# Patient Record
Sex: FEMALE | Race: WHITE | Employment: PART TIME | URBAN - METROPOLITAN AREA
[De-identification: names, ages, dates, MRNs, and addresses within clinical notes are randomized per-mention and may not be internally consistent; named-entity substitution may affect disease eponyms.]

---

## 2017-09-12 ENCOUNTER — ANESTHESIA EVENT (OUTPATIENT)
Dept: ENDOSCOPY | Age: 42
End: 2017-09-12
Payer: MEDICAID

## 2017-09-12 ENCOUNTER — HOSPITAL ENCOUNTER (OUTPATIENT)
Age: 42
Setting detail: OUTPATIENT SURGERY
Discharge: HOME | End: 2017-09-12
Attending: INTERNAL MEDICINE | Admitting: INTERNAL MEDICINE
Payer: MEDICAID

## 2017-09-12 ENCOUNTER — ANESTHESIA (OUTPATIENT)
Dept: ENDOSCOPY | Age: 42
End: 2017-09-12
Payer: MEDICAID

## 2017-09-12 VITALS
OXYGEN SATURATION: 96 % | HEART RATE: 86 BPM | BODY MASS INDEX: 31.1 KG/M2 | DIASTOLIC BLOOD PRESSURE: 60 MMHG | RESPIRATION RATE: 17 BRPM | TEMPERATURE: 98 F | WEIGHT: 175.5 LBS | SYSTOLIC BLOOD PRESSURE: 93 MMHG | HEIGHT: 63 IN

## 2017-09-12 LAB — HCG UR QL: NEGATIVE

## 2017-09-12 PROCEDURE — 74011250636 HC RX REV CODE- 250/636

## 2017-09-12 PROCEDURE — 81025 URINE PREGNANCY TEST: CPT

## 2017-09-12 PROCEDURE — 76040000019: Performed by: INTERNAL MEDICINE

## 2017-09-12 PROCEDURE — 74011000250 HC RX REV CODE- 250

## 2017-09-12 PROCEDURE — 74011250636 HC RX REV CODE- 250/636: Performed by: INTERNAL MEDICINE

## 2017-09-12 PROCEDURE — 76060000031 HC ANESTHESIA FIRST 0.5 HR: Performed by: INTERNAL MEDICINE

## 2017-09-12 RX ORDER — VENLAFAXINE HYDROCHLORIDE 75 MG/1
75 CAPSULE, EXTENDED RELEASE ORAL DAILY
COMMUNITY

## 2017-09-12 RX ORDER — MIDAZOLAM HYDROCHLORIDE 1 MG/ML
.25-5 INJECTION, SOLUTION INTRAMUSCULAR; INTRAVENOUS
Status: DISCONTINUED | OUTPATIENT
Start: 2017-09-12 | End: 2017-09-12 | Stop reason: HOSPADM

## 2017-09-12 RX ORDER — LIDOCAINE HYDROCHLORIDE 20 MG/ML
INJECTION, SOLUTION EPIDURAL; INFILTRATION; INTRACAUDAL; PERINEURAL AS NEEDED
Status: DISCONTINUED | OUTPATIENT
Start: 2017-09-12 | End: 2017-09-12 | Stop reason: HOSPADM

## 2017-09-12 RX ORDER — EPINEPHRINE 0.1 MG/ML
1 INJECTION INTRACARDIAC; INTRAVENOUS
Status: DISCONTINUED | OUTPATIENT
Start: 2017-09-12 | End: 2017-09-12 | Stop reason: HOSPADM

## 2017-09-12 RX ORDER — DEXTROMETHORPHAN/PSEUDOEPHED 2.5-7.5/.8
1.2 DROPS ORAL
Status: DISCONTINUED | OUTPATIENT
Start: 2017-09-12 | End: 2017-09-12 | Stop reason: HOSPADM

## 2017-09-12 RX ORDER — ATROPINE SULFATE 0.1 MG/ML
0.5 INJECTION INTRAVENOUS
Status: DISCONTINUED | OUTPATIENT
Start: 2017-09-12 | End: 2017-09-12 | Stop reason: HOSPADM

## 2017-09-12 RX ORDER — SODIUM CHLORIDE 9 MG/ML
150 INJECTION, SOLUTION INTRAVENOUS CONTINUOUS
Status: DISCONTINUED | OUTPATIENT
Start: 2017-09-12 | End: 2017-09-12 | Stop reason: HOSPADM

## 2017-09-12 RX ORDER — SUCRALFATE 1 G/10ML
1 SUSPENSION ORAL 3 TIMES DAILY
Qty: 414 ML | Refills: 0 | Status: SHIPPED | OUTPATIENT
Start: 2017-09-12 | End: 2018-08-24

## 2017-09-12 RX ORDER — SODIUM CHLORIDE 9 MG/ML
INJECTION, SOLUTION INTRAVENOUS
Status: DISCONTINUED | OUTPATIENT
Start: 2017-09-12 | End: 2017-09-12 | Stop reason: HOSPADM

## 2017-09-12 RX ORDER — SODIUM CHLORIDE 0.9 % (FLUSH) 0.9 %
5-10 SYRINGE (ML) INJECTION EVERY 8 HOURS
Status: DISCONTINUED | OUTPATIENT
Start: 2017-09-12 | End: 2017-09-12 | Stop reason: HOSPADM

## 2017-09-12 RX ORDER — SODIUM CHLORIDE 0.9 % (FLUSH) 0.9 %
5-10 SYRINGE (ML) INJECTION AS NEEDED
Status: DISCONTINUED | OUTPATIENT
Start: 2017-09-12 | End: 2017-09-12 | Stop reason: HOSPADM

## 2017-09-12 RX ORDER — PROPOFOL 10 MG/ML
INJECTION, EMULSION INTRAVENOUS AS NEEDED
Status: DISCONTINUED | OUTPATIENT
Start: 2017-09-12 | End: 2017-09-12 | Stop reason: HOSPADM

## 2017-09-12 RX ADMIN — PROPOFOL 30 MG: 10 INJECTION, EMULSION INTRAVENOUS at 08:32

## 2017-09-12 RX ADMIN — PROPOFOL 100 MG: 10 INJECTION, EMULSION INTRAVENOUS at 08:28

## 2017-09-12 RX ADMIN — SODIUM CHLORIDE: 9 INJECTION, SOLUTION INTRAVENOUS at 08:15

## 2017-09-12 RX ADMIN — PROPOFOL 30 MG: 10 INJECTION, EMULSION INTRAVENOUS at 08:30

## 2017-09-12 RX ADMIN — SODIUM CHLORIDE 150 ML/HR: 900 INJECTION, SOLUTION INTRAVENOUS at 08:14

## 2017-09-12 RX ADMIN — LIDOCAINE HYDROCHLORIDE 100 MG: 20 INJECTION, SOLUTION EPIDURAL; INFILTRATION; INTRACAUDAL; PERINEURAL at 08:28

## 2017-09-12 NOTE — H&P
[address] [facility], [address] [facility]          Pre-procedure History and Physical       NAME:  Kayley Block   :   1975   MRN:   [id number]     CHIEF COMPLAINT/HPI: See procedure note    PMH:  Past Medical History:   Diagnosis Date    Arthritis     Cholesteatoma     Depression     DM type 2 (di[facility] type 2) ([facility] 2009 after gastric bypass surgery    GERD (gastroesophageal reflux disease)     mostly before gastric bypass    H/O gastric bypass     Nausea & vomiting     with anesthesia    Panic attacks        PSH:  Past Surgical History:   Procedure Laterality Date    HX CARPAL TUNNEL RELEASE      right    HX CHOLECYSTECTOMY      HX GASTRIC BYPASS      2016    HX HEENT      3 surgeries - hole in eardrum x 2 - 2 surgeries to repair this/third surgery to removed cholesteatoma    HX ORTHOPAEDIC      plantar fascitis - right    HX OTHER SURGICAL      ulcer    HX TONSILLECTOMY      HX VASCULAR ACCESS      pt states she has not had this    MULTIPLE DELIVERY       x3       Allergies: Allergies   Allergen Reactions    Allegra [Fexofenadine] Hives       Home Medications:  Prior to Admission Medications   Prescriptions Last Dose Informant Patient Reported? Taking? INTRAUTERINE DEVICE, IUD, IU   Yes No   Sig: by IntraUTERine route. PANTOPRAZOLE SODIUM (PROTONIX PO) 2017 at Unknown time  Yes Yes   Sig: Take 40 mg by mouth daily. alprazolam (XANAX) 0.5 mg tablet 2017 at Unknown time  No Yes   Sig: Take 1 Tab by mouth two (2) times daily as needed for Sleep.   amoxicillin (AMOXIL) 500 mg capsule Not Taking at Unknown time  No No   Sig: Take 1 Cap by mouth three (3) times daily. fluticasone (FLONASE) 50 mcg/actuation nasal spray Not Taking at Unknown time  No No   Si Sprays by Both Nostrils route daily.    multivitamin (ONE A DAY) tablet 2017 at Unknown time  Yes Yes   Sig: Take 1 Tab by mouth daily. venlafaxine-SR (EFFEXOR XR) 75 mg capsule 9/11/2017 at Unknown time  Yes Yes   Sig: Take 75 mg by mouth daily. Facility-Administered Medications: None       Hospital Medications:  Current Facility-Administered Medications   Medication Dose Route Frequency    0.9% sodium chloride infusion  150 mL/hr IntraVENous CONTINUOUS    sodium chloride (NS) flush 5-10 mL  5-10 mL IntraVENous Q8H    sodium chloride (NS) flush 5-10 mL  5-10 mL IntraVENous PRN    midazolam (VERSED) injection 0.25-5 mg  0.25-5 mg IntraVENous Multiple    simethicone (MYLICON) 02PU/6.3CK oral drops 80 mg  1.2 mL Oral Multiple    atropine injection 0.5 mg  0.5 mg IntraVENous ONCE PRN    EPINEPHrine (ADRENALIN) 0.1 mg/mL syringe 1 mg  1 mg Endoscopically ONCE PRN     Facility-Administered Medications Ordered in Other Encounters   Medication Dose Route Frequency    0.9% sodium chloride infusion   IntraVENous CONTINUOUS    lidocaine (PF) (XYLOCAINE) 20 mg/mL (2 %) injection   IntraVENous PRN    propofol (DIPRIVAN) 10 mg/mL injection   IntraVENous PRN       Family History:  Family History   Problem Relation Age of Onset    Depression Mother     Heart Disease Father     Depression Sister     Cancer Other      non-lymphatic leukemia       Social History:  Social History   Substance Use Topics    Smoking status: Current Every Day Smoker     Packs/day: 1.00    Smokeless tobacco: Never Used    Alcohol use No      Comment: very rare/none per pt on 10/7/2016         PHYSICAL EXAM PRIOR TO SEDATION:  General: Alert, in no acute distress    Lungs:            CTA bilaterally  Heart:  Normal S1, S2    Abdomen: Soft, Non distended, Non tender. Normoactive bowel sounds. Assessment:   Stable for sedation administration.     Plan:   · Endoscopic procedure with sedation     Signed By: Marion Blunt MD     9/12/2017  8:28 AM

## 2017-09-12 NOTE — PROGRESS NOTES

## 2017-09-12 NOTE — ANESTHESIA PREPROCEDURE EVALUATION
Anesthetic History   No history of anesthetic complications            Review of Systems / Medical History  Patient summary reviewed, nursing notes reviewed and pertinent labs reviewed    Pulmonary  Within defined limits                 Neuro/Psych   Within defined limits           Cardiovascular  Within defined limits                     GI/Hepatic/Renal  Within defined limits   GERD           Endo/Other  Within defined limits  Diabetes    Obesity     Other Findings              Physical Exam    Airway  Mallampati: II  TM Distance: > 6 cm  Neck ROM: normal range of motion   Mouth opening: Normal     Cardiovascular  Regular rate and rhythm,  S1 and S2 normal,  no murmur, click, rub, or gallop             Dental  No notable dental hx       Pulmonary  Breath sounds clear to auscultation               Abdominal  GI exam deferred       Other Findings            Anesthetic Plan    ASA: 2  Anesthesia type: MAC          Induction: Intravenous  Anesthetic plan and risks discussed with: Patient

## 2017-09-12 NOTE — IP AVS SNAPSHOT
[address] 
[phone] Patient: Lena Ruff MRN: [id number] :1975 You are allergic to the following Allergen Reactions Allegra (Fexofenadine) Hives Recent Documentation Height Weight BMI OB Status Smoking Status 1.588 m 79.6 kg 31.59 kg/m2 IUD Current Every Day Smoker Emergency Contacts Name Discharge Info Relation Home Work Mobile Jossy Cruz [5]   [phone] About your hospitalization You were admitted on:  2017 You last received care in the:  [facility] ENDOSCOPY You were discharged on:  2017 Unit phone number:  [phone] Why you were hospitalized Your primary diagnosis was:  Not on File Providers Seen During Your Hospitalizations Provider Role Specialty Primary office phone Ravin Padron MD Attending Provider Gastroenterology [phone] Your Primary Care Physician (PCP) Primary Care Physician Office Phone Office Fax 300 [facility], [address] [place] [address] [phone] Follow-up Information None Current Discharge Medication List  
  
START taking these medications Dose & Instructions Dispensing Information Comments Morning Noon Evening Bedtime  
 sucralfate 100 mg/mL suspension Commonly known as:  Tatyana Bon Your last dose was: Your next dose is:    
   
   
 Dose:  1 g Take 10 mL by mouth three (3) times daily. Quantity:  414 mL Refills:  0 CONTINUE these medications which have NOT CHANGED Dose & Instructions Dispensing Information Comments Morning Noon Evening Bedtime ALPRAZolam 0.5 mg tablet Commonly known as:  Lulu  Your last dose was: Your next dose is:    
   
   
 Dose:  0.5 mg Take 1 Tab by mouth two (2) times daily as needed for Sleep. Quantity:  30 Tab Refills:  0  
     
   
   
   
  
 amoxicillin 500 mg capsule Commonly known as:  AMOXIL Your last dose was: Your next dose is:    
   
   
 Dose:  500 mg Take 1 Cap by mouth three (3) times daily. Quantity:  30 Cap Refills:  0  
     
   
   
   
  
 EFFEXOR XR 75 mg capsule Generic drug:  venlafaxine-SR Your last dose was: Your next dose is:    
   
   
 Dose:  75 mg Take 75 mg by mouth daily. Refills:  0  
     
   
   
   
  
 fluticasone 50 mcg/actuation nasal spray Commonly known as:  Adam Novi Your last dose was: Your next dose is:    
   
   
 Dose:  2 Spray 2 Sprays by Both Nostrils route daily. Quantity:  1 Bottle Refills:  0 INTRAUTERINE DEVICE (IUD) IU Your last dose was: Your next dose is:    
   
   
 by IntraUTERine route. Refills:  0  
     
   
   
   
  
 multivitamin tablet Commonly known as:  ONE A DAY Your last dose was: Your next dose is:    
   
   
 Dose:  1 Tab Take 1 Tab by mouth daily. Refills:  0 PROTONIX PO Your last dose was: Your next dose is:    
   
   
 Dose:  40 mg Take 40 mg by mouth daily. Refills:  0 Where to Get Your Medications Information on where to get these meds will be given to you by the nurse or doctor. ! Ask your nurse or doctor about these medications  
  sucralfate 100 mg/mL suspension Discharge Instructions [facility] [place] Kirill Alberto Viera. Lilliana Kwon M.D. 
[address] [place] [address] 
[phone] EGD DISCHARGE INSTRUCTIONS Burke Romo [id number] 
1975 DISCOMFORT: 
Sore throat- throat lozenges or warm salt water gargle Redness at IV site- apply warm compress to area; if redness or soreness persist- contact your physician Gaseous discomfort- walking, belching will help relieve any discomfort You may not operate a vehicle for 12 hours You may not engage in an occupation involving machinery or appliances for the  rest of today You may not drink alcoholic beverages for at least 12 hours Avoid making any critical decisions for at least 24 hours DIET: 
 You may resume your normal diet, but some patients find that heavy or large  meals may lead to indigestion or vomiting. I suggest a light meal as first food  intake. ACTIVITY: 
You may resume your normal daily activities. It is recommended that you spend the remainder of the day resting - avoid any strenuous activity. CALL CURTIS Sotelo ANY SIGN OF: Increasing pain, nausea, vomiting Abdominal distension (swelling) Significant bleeding (oral or rectal) Fever Pain in chest area Shortness of breath Additional Instructions: 
 Call Dr. Cher Ruvalcaba if any questions or problems at [phone] Anastomotic stricture due to ulcer. Smoking cessation. Continue PPI. Avoid NSIDs. Carafate trial if no history of kidney dysfunction. Discharge Orders None Introducing [facility]! Josh Sanchez introduces BlueCava patient portal. Now you can access parts of your medical record, email your doctor's office, and request medication refills online. 1. In your internet browser, go to [email or web address] 2. Click on the First Time User? Click Here link in the Sign In box. You will see the New Member Sign Up page. 3. Enter your BlueCava Access Code exactly as it appears below. You will not need to use this code after youve completed the sign-up process. If you do not sign up before the expiration date, you must request a new code. · BlueC[employer] Access Code: [email or web address] Expires: 12/11/2017  8:52 AM 
 
4. Enter the last four digits of your Social Security Number (xxxx) and Date of Birth (mm/dd/yyyy) as indicated and click Submit.  You will be taken to the next sign-up page. 5. Create a Zuki ID. This will be your Zuki login ID and cannot be changed, so think of one that is secure and easy to remember. 6. Create a Zuki password. You can change your password at any time. 7. Enter your Password Reset Question and Answer. This can be used at a later time if you forget your password. 8. Enter your e-mail address. You will receive e-mail notification when new information is available in [address] 9. Click Sign Up. You can now view and download portions of your medical record. 10. Click the Download Summary menu link to download a portable copy of your medical information. If you have questions, please visit the Frequently Asked Questions section of the Zuki website. Remember, Zuki is NOT to be used for urgent needs. For medical emergencies, dial 911. Now available from your iPhone and Android! General Information Please provide this summary of care documentation to your next provider. Patient Signature:  ____________________________________________________________ Date:  ____________________________________________________________  
  
[facility] Signature:  ____________________________________________________________ Date:  ____________________________________________________________

## 2017-09-12 NOTE — ROUTINE PROCESS
Lena adonisisabella  1975  [id number]    Situation:  Verbal report received from: [facility]  Procedure: Procedure(s):  ESOPHAGOGASTRODUODENOSCOPY (EGD)    Background:    Preoperative diagnosis: ANASTOMOSIS STRICTURE  Postoperative diagnosis: 1. Anastomotic Ulcer   2. Anastomotic Stricture    :  Dr. Chhaya Hoyos  Assistant(s): Endoscopy Technician-1: Marcella Genao  Endoscopy RN-1: Hanna Reyez RN    Specimens: * No specimens in log *  H. Pylori  no    Assessment:  Intra-procedure medications     Anesthesia gave intra-procedure sedation and medications, see anesthesia flow sheet yes    Intravenous fluids: NS@ KVO     Vital signs stable     Abdominal assessment: round and soft     Recommendation:  Discharge patient per MD order.     Family or Friend   Permission to share finding with family or friend yes

## 2017-09-12 NOTE — DISCHARGE INSTRUCTIONS
Elliot Castillo 912 Jace Che M.D.  174 [facility], [address] [place] [address]  [phone]         [address]  [id number]  1975    DISCOMFORT:  Sore throat- throat lozenges or warm salt water gargle  Redness at IV site- apply warm compress to area; if redness or soreness persist- contact your physician  Gaseous discomfort- walking, belching will help relieve any discomfort  You may not operate a vehicle for 12 hours  You may not engage in an occupation involving machinery or appliances for the  rest of today  You may not drink alcoholic beverages for at least 12 hours  Avoid making any critical decisions for at least 24 hours    DIET:   You may resume your normal diet, but some patients find that heavy or large  meals may lead to indigestion or vomiting. I suggest a light meal as first food  intake. ACTIVITY:  You may resume your normal daily activities. It is recommended that you spend the remainder of the day resting - avoid any strenuous activity. CALL CURTIS Palomares ANY SIGN OF:   Increasing pain, nausea, vomiting  Abdominal distension (swelling)  Significant bleeding (oral or rectal)  Fever   Pain in chest area  Shortness of breath    Additional Instructions:   Call Dr. Jace Che if any questions or problems at [phone]   Anastomotic stricture due to ulcer. Smoking cessation. Continue PPI. Avoid NSIDs. Carafate trial if no history of kidney dysfunction.

## 2017-09-12 NOTE — ANESTHESIA POSTPROCEDURE EVALUATION
Post-Anesthesia Evaluation and Assessment    Patient: Emily Pressley MRN: [id number]  SSN: [id number]    YOB: 1975  Age: 42 y.o. Sex: female       Cardiovascular Function/Vital Signs  Visit Vitals    BP 93/60    Pulse 86    Temp 36.7 °C (98 °F)    Resp 17    Ht 5' 2.5\" (1.588 m)    Wt 79.6 kg (175 lb 8 oz)    SpO2 96%    BMI 31.59 kg/m2       Patient is status post MAC anesthesia for Procedure(s):  ESOPHAGOGASTRODUODENOSCOPY (EGD). Nausea/Vomiting: None    Postoperative hydration reviewed and adequate. Pain:  Pain Scale 1: Numeric (0 - 10) (09/12/17 0801)  Pain Intensity 1: 0 (09/12/17 0744)   Managed    Neurological Status: At baseline    Mental Status and Level of Consciousness: Arousable    Pulmonary Status:   O2 Device: Nasal cannula (09/12/17 0834)   Adequate oxygenation and airway patent    Complications related to anesthesia: None    Post-anesthesia assessment completed.  No concerns    Signed By: Raffaele Lamar MD     September 12, 2017

## 2017-09-12 NOTE — PROCEDURES
Elliot Castillo 912 CURTIS Sigala [facility], [address] [facility]  [phone]               Esophagogastroduodenoscopy (EGD) Procedure Note    NAME: Burke Romo  :  1975  MRN:  [id number]    Indications:  Abdominal pain, epigastric; gastrojejunal anastomotic stricture/ulcer, gastric bypass     : Lorraine Jacob MD    Referring Provider:  Betzaida Pace MD; Dudley Garcia MD    Medicine:  [facility] anesthesia      Procedure Details:  After informed consent was obtained with all risks and benefits of the procedure explained and preprocedure exam completed, the patient was placed in the left lateral decubitus position. Universal protocol for patient identification was performed and documented in the nursing notes. Throughout the procedure, the patient's blood pressure was monitored at least every five minutes; pulse, and oxygen saturations were monitored continuously. All vital signs were documented in the nursing notes. The endoscope was inserted into the mouth and advanced under direct vision to proximal jejunum. A careful inspection was made as the gastroscope was withdrawn, including a retroflexed view of the proximal stomach; findings and interventions are described below. Findings:   Esophagus: normal  Stomach: no hiatal hernia, pouch length 5 cm; moderate anastomotic stricture secondary to circumferential ulcer at the anastomosis (dilation not performed). Able to transverse with the endoscope. Jejunum: normal    Interventions:    none    Specimens:   * No specimens in log *     EBL: None          Complications:     No immediate complications        Impression:  -As above. Recommendations:  -Acid suppression with a proton pump inhibitor.  -Smoking cessation.  -Carafate trial.    Signed by:  Lorraine Jacob MD         2017 8:37 AM

## 2018-08-24 RX ORDER — ALPRAZOLAM 1 MG/1
1 TABLET ORAL
COMMUNITY

## 2018-08-24 RX ORDER — PEDIATRIC MULTIVITAMIN NO.17
2 TABLET,CHEWABLE ORAL DAILY
COMMUNITY
End: 2019-04-24

## 2018-08-24 RX ORDER — LANOLIN ALCOHOL/MO/W.PET/CERES
1000 CREAM (GRAM) TOPICAL DAILY
COMMUNITY
End: 2019-04-24

## 2018-08-24 RX ORDER — CHOLECALCIFEROL (VITAMIN D3) 125 MCG
5000 CAPSULE ORAL DAILY
COMMUNITY
End: 2020-07-10

## 2018-08-24 RX ORDER — DIPHENHYDRAMINE HCL 25 MG
25 CAPSULE ORAL
COMMUNITY

## 2018-08-27 ENCOUNTER — ANESTHESIA EVENT (OUTPATIENT)
Dept: ENDOSCOPY | Age: 43
End: 2018-08-27
Payer: MEDICAID

## 2018-08-27 NOTE — ANESTHESIA PREPROCEDURE EVALUATION
Anesthetic History     PONV          Review of Systems / Medical History  Patient summary reviewed, nursing notes reviewed and pertinent labs reviewed    Pulmonary  Within defined limits                 Neuro/Psych         Psychiatric history     Cardiovascular  Within defined limits                     GI/Hepatic/Renal     GERD: well controlled      PUD     Endo/Other    Diabetes: well controlled, type 2    Obesity and arthritis     Other Findings            Physical Exam    Airway  Mallampati: II  TM Distance: > 6 cm  Neck ROM: normal range of motion   Mouth opening: Normal     Cardiovascular  Regular rate and rhythm,  S1 and S2 normal,  no murmur, click, rub, or gallop             Dental    Dentition: Edentulous     Pulmonary  Breath sounds clear to auscultation               Abdominal  GI exam deferred       Other Findings            Anesthetic Plan    ASA: 2  Anesthesia type: MAC          Induction: Intravenous  Anesthetic plan and risks discussed with: Patient

## 2018-08-28 ENCOUNTER — ANESTHESIA (OUTPATIENT)
Dept: ENDOSCOPY | Age: 43
End: 2018-08-28
Payer: MEDICAID

## 2018-08-28 ENCOUNTER — HOSPITAL ENCOUNTER (OUTPATIENT)
Age: 43
Setting detail: OUTPATIENT SURGERY
Discharge: HOME | End: 2018-08-28
Attending: INTERNAL MEDICINE | Admitting: INTERNAL MEDICINE
Payer: MEDICAID

## 2018-08-28 VITALS
HEIGHT: 63 IN | SYSTOLIC BLOOD PRESSURE: 95 MMHG | RESPIRATION RATE: 23 BRPM | OXYGEN SATURATION: 95 % | WEIGHT: 203 LBS | HEART RATE: 94 BPM | DIASTOLIC BLOOD PRESSURE: 64 MMHG | BODY MASS INDEX: 35.97 KG/M2 | TEMPERATURE: 97.5 F

## 2018-08-28 LAB — HCG UR QL: NEGATIVE

## 2018-08-28 PROCEDURE — 76060000031 HC ANESTHESIA FIRST 0.5 HR: Performed by: INTERNAL MEDICINE

## 2018-08-28 PROCEDURE — C1726 CATH, BAL DIL, NON-VASCULAR: HCPCS | Performed by: INTERNAL MEDICINE

## 2018-08-28 PROCEDURE — 74011250636 HC RX REV CODE- 250/636

## 2018-08-28 PROCEDURE — 81025 URINE PREGNANCY TEST: CPT

## 2018-08-28 PROCEDURE — 76040000019: Performed by: INTERNAL MEDICINE

## 2018-08-28 PROCEDURE — 77030018712 HC DEV BLLN INFL BSC -B: Performed by: INTERNAL MEDICINE

## 2018-08-28 RX ORDER — SODIUM CHLORIDE 0.9 % (FLUSH) 0.9 %
5-10 SYRINGE (ML) INJECTION EVERY 8 HOURS
Status: DISCONTINUED | OUTPATIENT
Start: 2018-08-28 | End: 2018-08-28 | Stop reason: HOSPADM

## 2018-08-28 RX ORDER — PROPOFOL 10 MG/ML
INJECTION, EMULSION INTRAVENOUS AS NEEDED
Status: DISCONTINUED | OUTPATIENT
Start: 2018-08-28 | End: 2018-08-28 | Stop reason: HOSPADM

## 2018-08-28 RX ORDER — EPINEPHRINE 0.1 MG/ML
1 INJECTION INTRACARDIAC; INTRAVENOUS
Status: DISCONTINUED | OUTPATIENT
Start: 2018-08-28 | End: 2018-08-28 | Stop reason: HOSPADM

## 2018-08-28 RX ORDER — SODIUM CHLORIDE 9 MG/ML
150 INJECTION, SOLUTION INTRAVENOUS CONTINUOUS
Status: DISCONTINUED | OUTPATIENT
Start: 2018-08-28 | End: 2018-08-28 | Stop reason: HOSPADM

## 2018-08-28 RX ORDER — DEXTROMETHORPHAN/PSEUDOEPHED 2.5-7.5/.8
1.2 DROPS ORAL
Status: DISCONTINUED | OUTPATIENT
Start: 2018-08-28 | End: 2018-08-28 | Stop reason: HOSPADM

## 2018-08-28 RX ORDER — SUCRALFATE 1 G/10ML
1 SUSPENSION ORAL 3 TIMES DAILY
Qty: 414 ML | Refills: 0 | Status: SHIPPED | OUTPATIENT
Start: 2018-08-28 | End: 2018-11-13

## 2018-08-28 RX ORDER — LIDOCAINE HYDROCHLORIDE 20 MG/ML
INJECTION, SOLUTION EPIDURAL; INFILTRATION; INTRACAUDAL; PERINEURAL AS NEEDED
Status: DISCONTINUED | OUTPATIENT
Start: 2018-08-28 | End: 2018-08-28 | Stop reason: HOSPADM

## 2018-08-28 RX ORDER — ATROPINE SULFATE 0.1 MG/ML
0.5 INJECTION INTRAVENOUS
Status: DISCONTINUED | OUTPATIENT
Start: 2018-08-28 | End: 2018-08-28 | Stop reason: HOSPADM

## 2018-08-28 RX ORDER — SODIUM CHLORIDE 0.9 % (FLUSH) 0.9 %
5-10 SYRINGE (ML) INJECTION AS NEEDED
Status: DISCONTINUED | OUTPATIENT
Start: 2018-08-28 | End: 2018-08-28 | Stop reason: HOSPADM

## 2018-08-28 RX ORDER — SODIUM CHLORIDE 9 MG/ML
INJECTION, SOLUTION INTRAVENOUS
Status: DISCONTINUED | OUTPATIENT
Start: 2018-08-28 | End: 2018-08-28 | Stop reason: HOSPADM

## 2018-08-28 RX ORDER — MIDAZOLAM HYDROCHLORIDE 1 MG/ML
.25-5 INJECTION, SOLUTION INTRAMUSCULAR; INTRAVENOUS
Status: DISCONTINUED | OUTPATIENT
Start: 2018-08-28 | End: 2018-08-28 | Stop reason: HOSPADM

## 2018-08-28 RX ADMIN — PROPOFOL 25 MG: 10 INJECTION, EMULSION INTRAVENOUS at 08:10

## 2018-08-28 RX ADMIN — PROPOFOL 25 MG: 10 INJECTION, EMULSION INTRAVENOUS at 08:07

## 2018-08-28 RX ADMIN — PROPOFOL 25 MG: 10 INJECTION, EMULSION INTRAVENOUS at 08:09

## 2018-08-28 RX ADMIN — PROPOFOL 25 MG: 10 INJECTION, EMULSION INTRAVENOUS at 08:13

## 2018-08-28 RX ADMIN — PROPOFOL 75 MG: 10 INJECTION, EMULSION INTRAVENOUS at 08:06

## 2018-08-28 RX ADMIN — PROPOFOL 25 MG: 10 INJECTION, EMULSION INTRAVENOUS at 08:08

## 2018-08-28 RX ADMIN — LIDOCAINE HYDROCHLORIDE 40 MG: 20 INJECTION, SOLUTION EPIDURAL; INFILTRATION; INTRACAUDAL; PERINEURAL at 08:06

## 2018-08-28 RX ADMIN — PROPOFOL 25 MG: 10 INJECTION, EMULSION INTRAVENOUS at 08:12

## 2018-08-28 RX ADMIN — SODIUM CHLORIDE: 9 INJECTION, SOLUTION INTRAVENOUS at 08:11

## 2018-08-28 RX ADMIN — PROPOFOL 25 MG: 10 INJECTION, EMULSION INTRAVENOUS at 08:14

## 2018-08-28 RX ADMIN — SODIUM CHLORIDE: 9 INJECTION, SOLUTION INTRAVENOUS at 07:45

## 2018-08-28 RX ADMIN — PROPOFOL 25 MG: 10 INJECTION, EMULSION INTRAVENOUS at 08:16

## 2018-08-28 RX ADMIN — PROPOFOL 25 MG: 10 INJECTION, EMULSION INTRAVENOUS at 08:11

## 2018-08-28 NOTE — PROGRESS NOTES
CRE balloon dilatation of the esophagus   10 mm Balloon inflated to 3 ATMs and held for 60 seconds. 11 mm Balloon inflated to 5 ATMs and held for 60 seconds. 12 mm Balloon inflated to 8 ATMs and held for 60 seconds. No subcutaneous crepitus of the chest or cervical region was noted post dilatation.

## 2018-08-28 NOTE — PROCEDURES
[address] [place] [address] Marielle Roberts M.D.  Gely Higuera, [address] [place] [address]  [phone]               Esophagogastroduodenoscopy (EGD) Procedure Note    NAME: Diana De La Cruz  :  1975  MRN:  [id number]    Indications:  Vomiting, gastric bypass     : Makenzie Roman MD    Referring Provider:  Marielle Rivers MD; Nessa Holly MD    Medicine:  [facility] anesthesia      Procedure Details:  After informed consent was obtained with all risks and benefits of the procedure explained and preprocedure exam completed, the patient was placed in the left lateral decubitus position. Universal protocol for patient identification was performed and documented in the nursing notes. Throughout the procedure, the patient's blood pressure was monitored at least every five minutes; pulse, and oxygen saturations were monitored continuously. All vital signs were documented in the nursing notes. The endoscope was inserted into the mouth and advanced under direct vision to proximal jejunum. A careful inspection was made as the gastroscope was withdrawn, including a retroflexed view of the proximal stomach; findings and interventions are described below. Findings:   Esophagus:normal  Stomach: anastomotic stricture-able to pass with endoscope; s/p TTS balloon dilation from 10 mm up to 12 mm. Jejunum: normal    Interventions:    anastomosis dilation    Specimens:   * No specimens in log *     EBL: None          Complications:     No immediate complications        Impression:  -As above. Recommendations:  -Acid suppression with a proton pump inhibitor.  -Start Carafate  -SMOKING CESSATION    Signed by:  Makenzie Roman MD         2018 8:26 AM

## 2018-08-28 NOTE — PROGRESS NOTES

## 2018-08-28 NOTE — H&P
[address]  174 [facility], [address] [place] [address]          Pre-procedure History and Physical       NAME:  Allen Jefferson   :   1975   MRN:   [id number]     CHIEF COMPLAINT/HPI: See procedure note    PMH:  Past Medical History:   Diagnosis Date    Arthritis     Cholesteatoma     Depression     DM type 2 (diabetes mellitus, type 2) ([address] 2009    resolved 2016 after gastric bypass surgery    GERD (gastroesophageal reflux disease)     mostly before gastric bypass    H/O gastric bypass     Ill-defined condition     anastomatic stricture    Morbid obesity (HCC)     Nausea & vomiting     with anesthesia    Panic attacks     PUD (peptic ulcer disease)        PSH:  Past Surgical History:   Procedure Laterality Date    HX CARPAL TUNNEL RELEASE      right    HX CHOLECYSTECTOMY      HX GASTRIC BYPASS      2016    HX HEENT      3 surgeries - hole in eardrum x 2 - 2 surgeries to repair this/third surgery to removed cholesteatoma    HX ORTHOPAEDIC      plantar fascitis - right    HX OTHER SURGICAL      ulcer    HX TONSILLECTOMY      HX VASCULAR ACCESS      pt states she has not had this    MULTIPLE DELIVERY       x3       Allergies: Allergies   Allergen Reactions    Allegra [Fexofenadine] Hives       Home Medications:  Prior to Admission Medications   Prescriptions Last Dose Informant Patient Reported? Taking? ALPRAZolam (XANAX) 1 mg tablet 2018 at Unknown time  Yes Yes   Sig: Take 1 mg by mouth two (2) times a day. INTRAUTERINE DEVICE, IUD, IU Unknown at Unknown time  Yes No   Sig: by IntraUTERine route. OTHER 2018 at Unknown time  Yes Yes   Sig: Viactiv calcium. Chews 2 po once daily. PANTOPRAZOLE SODIUM (PROTONIX PO) 2018 at Unknown time  Yes Yes   Sig: Take 40 mg by mouth daily. cholecalciferol (VITAMIN D3) 5,000 unit capsule 2018 at Unknown time  Yes Yes   Sig: Take 5,000 Units by mouth daily.    cyanocobalamin (VITAMIN B-12) 500 mcg tablet 8/27/2018 at Unknown time  Yes Yes   Sig: Take 1,000 mcg by mouth daily. diphenhydrAMINE (ALLERGY RELIEF,DIPHENHYDRAMIN,) 25 mg capsule 8/27/2018 at Unknown time  Yes Yes   Sig: Take 25 mg by mouth daily. pediatric multivitamins (FLINTSTONES MULTIVITAMIN) chewable tablet 8/26/2018 at Unknown time  Yes Yes   Sig: Take 2 Tabs by mouth daily. venlafaxine-SR (EFFEXOR XR) 75 mg capsule 8/27/2018 at Unknown time  Yes Yes   Sig: Take 75 mg by mouth daily.       Facility-Administered Medications: None       Hospital Medications:  Current Facility-Administered Medications   Medication Dose Route Frequency    0.9% sodium chloride infusion  150 mL/hr IntraVENous CONTINUOUS    sodium chloride (NS) flush 5-10 mL  5-10 mL IntraVENous Q8H    sodium chloride (NS) flush 5-10 mL  5-10 mL IntraVENous PRN    midazolam (VERSED) injection 0.25-5 mg  0.25-5 mg IntraVENous Multiple    simethicone (MYLICON) 37MC/2.0SO oral drops 80 mg  1.2 mL Oral Multiple    atropine injection 0.5 mg  0.5 mg IntraVENous ONCE PRN    EPINEPHrine (ADRENALIN) 0.1 mg/mL syringe 1 mg  1 mg Endoscopically ONCE PRN     Facility-Administered Medications Ordered in Other Encounters   Medication Dose Route Frequency    0.9% sodium chloride infusion   IntraVENous CONTINUOUS       Family History:  Family History   Problem Relation Age of Onset    Depression Mother     Other Mother      osteopenia    Depression Sister     Cancer Other      non-lymphatic leukemia    Heart Disease Paternal Uncle     Alzheimer Maternal Grandmother     Alzheimer Paternal Grandmother        Social History:  Social History   Substance Use Topics    Smoking status: Current Every Day Smoker     Packs/day: 1.00    Smokeless tobacco: Never Used    Alcohol use No      Comment: very rare/none per pt on 10/7/2016         PHYSICAL EXAM PRIOR TO SEDATION:  General: Alert, in no acute distress    Lungs:            CTA bilaterally  Heart:  Normal S1, S2    Abdomen: Soft, Non distended, Non tender. Normoactive bowel sounds. Assessment:   Stable for sedation administration.     Plan:   · Endoscopic procedure with sedation     Signed By: Alicia Monahan MD     8/28/2018  8:05 AM

## 2018-08-28 NOTE — ANESTHESIA POSTPROCEDURE EVALUATION
Post-Anesthesia Evaluation and Assessment Patient: Rylie Dotson MRN: [id number]  SSN: [id number] YOB: 1975  Age: 36 y.o. Sex: female Cardiovascular Function/Vital Signs Visit Vitals  BP 95/64  Pulse 94  Temp 36.4 °C (97.5 °F)  Resp 23  
 Ht 5' 2.5\" (1.588 m)  Wt 92.1 kg (203 lb)  SpO2 95%  BMI 36.54 kg/m2 Patient is status post MAC anesthesia for Procedure(s): ESOPHAGOGASTRODUODENOSCOPY (EGD) Anastomoic  DILATION. Nausea/Vomiting: None Postoperative hydration reviewed and adequate. Pain: 
Pain Scale 1: Numeric (0 - 10) (08/28/18 0904) Pain Intensity 1: 0 (08/28/18 0904) Managed Neurological Status: At baseline Mental Status and Level of Consciousness: Arousable Pulmonary Status:  
O2 Device: Room air (08/28/18 0904) Adequate oxygenation and airway patent Complications related to anesthesia: None Post-anesthesia assessment completed. No concerns Signed By: Leonie Yuan MD   
 August 28, 2018

## 2018-08-28 NOTE — IP AVS SNAPSHOT
[address] 
[phone] Patient: Kassy Elizondo MRN: [id number] :1975 About your hospitalization You were admitted on:  2018 You last received care in the:  [facility] ENDOSCOPY You were discharged on:  2018 Why you were hospitalized Your primary diagnosis was:  Not on File Follow-up Information None Discharge Orders None A check timothy indicates which time of day the medication should be taken. My Medications START taking these medications Instructions Each Dose to Equal  
 Morning Noon Evening Bedtime  
 sucralfate 100 mg/mL suspension Commonly known as:  Georgeana Mallet Your last dose was: Your next dose is: Take 10 mL by mouth three (3) times daily. 1 g CONTINUE taking these medications Instructions Each Dose to Equal  
 Morning Noon Evening Bedtime ALLERGY RELIEF(DIPHENHYDRAMIN) 25 mg capsule Generic drug:  diphenhydrAMINE Your last dose was: Your next dose is: Take 25 mg by mouth daily. 25 mg  
    
   
   
   
  
 cholecalciferol 5,000 unit capsule Commonly known as:  VITAMIN D3 Your last dose was: Your next dose is: Take 5,000 Units by mouth daily. 5000 Units EFFEXOR XR 75 mg capsule Generic drug:  venlafaxine-SR Your last dose was: Your next dose is: Take 75 mg by mouth daily. 75 mg FLINTSTONES MULTIVITAMIN chewable tablet Generic drug:  pediatric multivitamins Your last dose was: Your next dose is: Take 2 Tabs by mouth daily. 2 Tab INTRAUTERINE DEVICE (IUD) IU Your last dose was: Your next dose is:    
   
   
 by IntraUTERine route.   
     
   
   
   
  
 OTHER  
   
 Your last dose was: Your next dose is:    
   
   
 Viactiv calcium. Chews 2 po once daily. PROTONIX PO Your last dose was: Your next dose is: Take 40 mg by mouth daily. 40 mg  
    
   
   
   
  
 VITAMIN B-12 500 mcg tablet Generic drug:  cyanocobalamin Your last dose was: Your next dose is: Take 1,000 mcg by mouth daily. 1000 mcg XANAX 1 mg tablet Generic drug:  ALPRAZolam  
   
Your last dose was: Your next dose is: Take 1 mg by mouth two (2) times a day. 1 mg Where to Get Your Medications Information on where to get these meds will be given to you by the nurse or doctor. ! Ask your nurse or doctor about these medications  
  sucralfate 100 mg/mL suspension Discharge Instructions [address] [facility] Nish Estimable. Lew Coombs M.D. 
[address] [place] [address] 
[phone] EGD DISCHARGE INSTRUCTIONS Lenora Malloy [id number] 
1975 DISCOMFORT: 
Sore throat- throat lozenges or warm salt water gargle Redness at IV site- apply warm compress to area; if redness or soreness persist- contact your physician Gaseous discomfort- walking, belching will help relieve any discomfort You may not operate a vehicle for 12 hours You may not engage in an occupation involving machinery or appliances for the  rest of today You may not drink alcoholic beverages for at least 12 hours Avoid making any critical decisions for at least 24 hours DIET: 
 You may resume your normal diet, but some patients find that heavy or large  meals may lead to indigestion or vomiting. I suggest a light meal as first food  Intake. I recommend a whole food, plant-based diet for your overall health. ACTIVITY: 
You may resume your normal daily activities.   It is recommended that you spend the remainder of the day resting - avoid any strenuous activity. CALL CURTIS Kamara ANY SIGN OF: Increasing pain, nausea, vomiting Abdominal distension (swelling) Significant bleeding (oral or rectal) Fever Pain in chest area Shortness of breath Additional Instructions: 
 Call Dr. Rubi Taveras if any questions or problems at [phone] EGD showed anastomotic ulcer s/p dilation. SMOKING CESSATION. PPI. Trial of Carafate. Follow-up with Dr. Kaleb Fall. D[employer] Activation Thank you for requesting access to [employer]. Please follow the instructions below to securely access and download your online medical record. DaggerFoil Group allows you to send messages to your doctor, view your test results, renew your prescriptions, schedule appointments, and more. How Do I Sign Up? 1. In your internet browser, go to [email or web address] 
2. Click on the First Time User? Click Here link in the Sign In box. You will be redirect to the New Member Sign Up page. 3. Enter your DaggerFoil Group Access Code exactly as it appears below. You will not need to use this code after youve completed the sign-up process. If you do not sign up before the expiration date, you must request a new code. Dagger[employer]oil [employer] Access Code: [email or web address] Expires: 2018  6:39 AM (This is the date your [employer] access code will ) 4. Enter the last four digits of your Social Security Number (xxxx) and Date of Birth (mm/dd/yyyy) as indicated and click Submit. You will be taken to the next sign-up page. 5. Create a Dagger[employer] ID. This will be your [employer] login ID and cannot be changed, so think of one that is secure and easy to remember. 6. Create a D[employer] password. You can change your password at any time. 7. Enter your Password Reset Question and Answer. This can be used at a later time if you forget your password. 8. Enter your e-mail address. You will receive e-mail notification when new information is available in [address] 9. Click Sign Up. You can now view and download portions of your medical record. 10. Click the Download Summary menu link to download a portable copy of your medical information. Additional Information If you have questions, please visit the Frequently Asked Questions section of the [employer] website at [email or web address] Remember, MyChart is NOT to be used for urgent needs. For medical emergencies, dial 911. Esophageal Dilation: What to Expect at [facility] Your Recovery After you have esophageal dilation, you will stay at the hospital or surgery center for 1 to 2 hours. This will allow the medicine to wear off. You will be able to go home after your doctor or nurse checks to make sure you are not having any problems. This care sheet gives you a general idea about how long it will take for you to recover. But each person recovers at a different pace. Follow the steps below to get better as quickly as possible. How can you care for yourself at home? Activity 
  · Rest as much as you need to after you go home.  
  · You should be able to go back to your usual activities the day after the procedure. Diet 
  · Follow your doctor's directions for eating after the procedure.  
  · Drink plenty of fluids (unless your doctor has told you not to). Medicines 
  · Your doctor will tell you if and when you can restart your medicines. He or she will also give you instructions about taking any new medicines.  
  · If you take blood thinners, such as warfarin (Coumadin), clopidogrel (Plavix), or aspirin, be sure to talk to your doctor. He or she will tell you if and when to start taking those medicines again. Make sure that you understand exactly what your doctor wants you to do.  
  · If you have a sore throat the day after the procedure, use an over-the-counter spray to numb your throat. Sucking on throat lozenges and gargling with warm salt water may also help relieve your symptoms. Follow-up care is a key part of your treatment and safety. Be sure to make and go to all appointments, and call your doctor if you are having problems. It's also a good idea to know your test results and keep a list of the medicines you take. When should you call for help? Call 911 anytime you think you may need emergency care. For example, call if: 
  · You passed out (lost consciousness).  
  · You have trouble breathing.  
  · Your stools are maroon or very bloody  
 Call your doctor now or seek immediate medical care if: 
  · You have new or worse belly pain.  
  · You have a fever.  
  · You have new or more blood in your stools.  
  · You are sick to your stomach and cannot drink fluids.  
  · You cannot pass stools or gas.  
  · You have pain that does not get better after you take pain medicine.  
 Watch closely for changes in your health, and be sure to contact your doctor if: 
  · Your throat still hurts after a day or two.  
  · You do not get better as expected. Where can you learn more? Go to http://queta-boby.info/. Enter D823 in the search box to learn more about \"Esophageal Dilation: What to Expect at Home. \" Current as of: May 12, 2017 Content Version: 11.7 © 7087-0034 AdScoot, Incorporated. Care instructions adapted under license by TheWrap (which disclaims liability or warranty for this information). If you have questions about a medical condition or this instruction, always ask your healthcare professional. [place] [address] any warranty or liability for your use of this information. Introducing hospitals & HEALTH SERVICES! [facility] introduces CrowdPC patient portal. Now you can access parts of your medical record, email your doctor's office, and request medication refills online. 1. In your internet browser, go to [email or web address] 2. Click on the First Time User? Click Here link in the Sign In box. You will see the New Member Sign Up page. 3. Enter your KelBillet Access Code exactly as it appears below. You will not need to use this code after youve completed the sign-up process. If you do not sign up before the expiration date, you must request a new code. · KelBillet Access Code: [email or web address] Expires: 11/26/2018  6:39 AM 
 
4. Enter the last four digits of your Social Security Number (xxxx) and Date of Birth (mm/dd/yyyy) as indicated and click Submit. You will be taken to the next sign-up page. 5. Create a KelBillet ID. This will be your KelBillet login ID and cannot be changed, so think of one that is secure and easy to remember. 6. Create a KelBillet password. You can change your password at any time. 7. Enter your Password Reset Question and Answer. This can be used at a later time if you forget your password. 8. Enter your e-mail address. You will receive e-mail notification when new information is available in [address] 9. Click Sign Up. You can now view and download portions of your medical record. 10. Click the Download Summary menu link to download a portable copy of your medical information. If you have questions, please visit the Frequently Asked Questions section of the KelBillet website. Remember, KelBillet is NOT to be used for urgent needs. For medical emergencies, dial 911. Now available from your iPhone and Android! Introducing Bony Wilson As a [facility] patient, I wanted to make you aware of our electronic visit tool called Bony Wilson. Jensendieteritalo Cortezer 24/7 allows you to connect within minutes with a medical provider 24 hours a day, seven days a week via a mobile device or tablet or logging into a secure website from your computer. You can access Bony Wilson from anywhere in the [place].  
 
A virtual visit might be right for you when you have a simple condition and feel like you just dont want to get out of bed, or cant get away from work for an appointment, when your regular [employer] provider is not available (evenings, weekends or holidays), or when youre out of town and need minor care. Electronic visits cost only $49 and if the Therma-Waveer 24/7 provider determines a prescription is needed to treat your condition, one can be electronically transmitted to a nearby pharmacy*. Please take a moment to enroll today if you have not already done so. The enrollment process is free and takes just a few minutes. To enroll, please download the CRAVE 24/7 sharee to your tablet or phone, or visit [email or web address] to enroll on your computer. And, as an [address] [place] [address] patient with a Re.nooble account, the results of your visits will be scanned into your electronic medical record and your primary care provider will be able to view the scanned results. We urge you to continue to see your [facility] provider for your ongoing medical care. And while your primary care provider may not be the one available when you seek a One Parts Bill virtual visit, the peace of mind you get from getting a real diagnosis real time can be priceless. For more information on One Parts Bill, view our Frequently Asked Questions (FAQs) at [email or web address]. Sincerely, 
 
Nicol De Souza MD 
Chief Medical Officer [facility] Sarah Rell *:  certain medications cannot be prescribed via One Parts Bill Providers Seen During Your Hospitalization Provider Specialty Primary office phone Rere Tabares MD Gastroenterology [phone] Your Primary Care Physician (PCP) Primary Care Physician Office Phone Office Fax [address] [facility], [address] [phone] You are allergic to the following Allergen Reactions Allegra (Fexofenadine) Hives Recent Documentation Height Weight BMI OB Status Smoking Status 1.588 m 92.1 kg 36.54 kg/m2 IUD Current Every Day Smoker Emergency Contacts Name Discharge Info Relation Home Work Mobile Sue Ortiz CAREGIVER [3] Friend [5]   [phone] Patient Belongings The following personal items are in your possession at time of discharge: 
  Dental Appliances: None  Visual Aid: Glasses, At home Please provide this summary of care documentation to your next provider. Signatures-by signing, you are acknowledging that this After Visit Summary has been reviewed with you and you have received a copy. Patient Signature:  ____________________________________________________________ Date:  ____________________________________________________________  
  
[facility] [place] Provider Signature:  ____________________________________________________________ Date:  ____________________________________________________________

## 2018-08-28 NOTE — DISCHARGE INSTRUCTIONS
Elliot Castillo [address] M.D.  [address] [facility]  [phone]          [facility]  [id number]  1975    DISCOMFORT:  Sore throat- throat lozenges or warm salt water gargle  Redness at IV site- apply warm compress to area; if redness or soreness persist- contact your physician  Gaseous discomfort- walking, belching will help relieve any discomfort  You may not operate a vehicle for 12 hours  You may not engage in an occupation involving machinery or appliances for the  rest of today  You may not drink alcoholic beverages for at least 12 hours  Avoid making any critical decisions for at least 24 hours    DIET:   You may resume your normal diet, but some patients find that heavy or large  meals may lead to indigestion or vomiting. I suggest a light meal as first food  Intake. I recommend a whole food, plant-based diet for your overall health. ACTIVITY:  You may resume your normal daily activities. It is recommended that you spend the remainder of the day resting - avoid any strenuous activity. CALL CURTIS Huerta ANY SIGN OF:   Increasing pain, nausea, vomiting  Abdominal distension (swelling)  Significant bleeding (oral or rectal)  Fever   Pain in chest area  Shortness of breath    Additional Instructions:   Call Dr. Mariela House if any questions or problems at [phone]  EGD showed anastomotic ulcer s/p dilation. SMOKING CESSATION. PPI. Trial of Carafate. Follow-up with Dr. Mandie Long. Medication Review Activation    Thank you for requesting access to Medication Review. Please follow the instructions below to securely access and download your online medical record. Medication Review allows you to send messages to your doctor, view your test results, renew your prescriptions, schedule appointments, and more. How Do I Sign Up? 1. In your internet browser, go to [email or web address]  2. Click on the First Time User?  Click Here link in the Sign In box. You will be redirect to the New Member Sign Up page. 3. Enter your Coinplug Access Code exactly as it appears below. You will not need to use this code after youve completed the sign-up process. If you do not sign up before the expiration date, you must request a new code. Coinplug Access Code: [email or web address]  Expires: 2018  6:39 AM (This is the date your Coinplug access code will )    4. Enter the last four digits of your Social Security Number (xxxx) and Date of Birth (mm/dd/yyyy) as indicated and click Submit. You will be taken to the next sign-up page. 5. Create a Coinplug ID. This will be your Coinplug login ID and cannot be changed, so think of one that is secure and easy to remember. 6. Create a Coinplug password. You can change your password at any time. 7. Enter your Password Reset Question and Answer. This can be used at a later time if you forget your password. 8. Enter your e-mail address. You will receive e-mail notification when new information is available in [address] 9. Click Sign Up. You can now view and download portions of your medical record. 10. Click the Download Summary menu link to download a portable copy of your medical information. Additional Information    If you have questions, please visit the Frequently Asked Questions section of the Coinplug website at [email or web address] Remember, Coinplug is NOT to be used for urgent needs. For medical emergencies, dial 911. Esophageal Dilation: What to Expect at [address] [facility]  After you have esophageal dilation, you will stay at the hospital or surgery center for 1 to 2 hours. This will allow the medicine to wear off. You will be able to go home after your doctor or nurse checks to make sure you are not having any problems. This care sheet gives you a general idea about how long it will take for you to recover. But each person recovers at a different pace.  Follow the steps below to get better as quickly as possible. How can you care for yourself at home? Activity    · Rest as much as you need to after you go home.     · You should be able to go back to your usual activities the day after the procedure. Diet    · Follow your doctor's directions for eating after the procedure.     · Drink plenty of fluids (unless your doctor has told you not to). Medicines    · Your doctor will tell you if and when you can restart your medicines. He or she will also give you instructions about taking any new medicines.     · If you take blood thinners, such as warfarin (Coumadin), clopidogrel (Plavix), or aspirin, be sure to talk to your doctor. He or she will tell you if and when to start taking those medicines again. Make sure that you understand exactly what your doctor wants you to do.     · If you have a sore throat the day after the procedure, use an over-the-counter spray to numb your throat. Sucking on throat lozenges and gargling with warm salt water may also help relieve your symptoms. Follow-up care is a key part of your treatment and safety. Be sure to make and go to all appointments, and call your doctor if you are having problems. It's also a good idea to know your test results and keep a list of the medicines you take. When should you call for help? Call 911 anytime you think you may need emergency care.  For example, call if:    · You passed out (lost consciousness).     · You have trouble breathing.     · Your stools are maroon or very bloody    Call your doctor now or seek immediate medical care if:    · You have new or worse belly pain.     · You have a fever.     · You have new or more blood in your stools.     · You are sick to your stomach and cannot drink fluids.     · You cannot pass stools or gas.     · You have pain that does not get better after you take pain medicine.    Watch closely for changes in your health, and be sure to contact your doctor if:    · Your throat still hurts after a day or two.     · You do not get better as expected. Where can you learn more? Go to http://queta-boby.info/. Enter Z118 in the search box to learn more about \"Esophageal Dilation: What to Expect at Home. \"  Current as of: May 12, 2017  Content Version: 11.7  © 2402-7929 Ulaola. Care instructions adapted under license by "Sphere (Spherical, Inc.)" (which disclaims liability or warranty for this information). If you have questions about a medical condition or this instruction, always ask your healthcare professional. [place] [address] any warranty or liability for your use of this information.

## 2018-08-28 NOTE — ROUTINE PROCESS
Rylie Dotson  1975  [id number]    Situation:  Verbal report received from: Jh Fischer RN  Procedure: Procedure(s):  ESOPHAGOGASTRODUODENOSCOPY (EGD)  Anastomoic  DILATION    Background:    Preoperative diagnosis: VOMITING  Postoperative diagnosis: 1. Anastomotic Stricture    :  Dr. Nabil العلي  Assistant(s): Endoscopy Technician-1: Knute Schaumann A Resto  Endoscopy RN-1: Niru Chowdhury RN    Specimens: * No specimens in log *  H. Pylori  no    Assessment:  Intra-procedure medications   Anesthesia gave intra-procedure sedation and medications, see anesthesia flow sheet yes    Intravenous fluids: NS@ KVO     Vital signs stable     Abdominal assessment: round and soft     Recommendation:  Discharge patient per MD order.   Family or Friend   Permission to share finding with family or friend yes

## 2018-11-13 ENCOUNTER — OFFICE VISIT (OUTPATIENT)
Dept: URGENT CARE | Age: 43
End: 2018-11-13

## 2018-11-13 VITALS
HEART RATE: 100 BPM | HEIGHT: 63 IN | WEIGHT: 212 LBS | BODY MASS INDEX: 37.56 KG/M2 | OXYGEN SATURATION: 97 % | SYSTOLIC BLOOD PRESSURE: 132 MMHG | TEMPERATURE: 98 F | RESPIRATION RATE: 16 BRPM | DIASTOLIC BLOOD PRESSURE: 77 MMHG

## 2018-11-13 DIAGNOSIS — J32.9 SINUSITIS, UNSPECIFIED CHRONICITY, UNSPECIFIED LOCATION: Primary | ICD-10-CM

## 2018-11-13 DIAGNOSIS — H66.92 ACUTE LEFT OTITIS MEDIA: ICD-10-CM

## 2018-11-13 RX ORDER — AMOXICILLIN 875 MG/1
875 TABLET, FILM COATED ORAL EVERY 12 HOURS
Qty: 20 TAB | Refills: 0 | Status: SHIPPED | OUTPATIENT
Start: 2018-11-13 | End: 2018-11-23

## 2018-11-13 NOTE — PATIENT INSTRUCTIONS
Ear Infection (Otitis Media): Care Instructions  Your Care Instructions    An ear infection may start with a cold and affect the middle ear (otitis media). It can hurt a lot. Most ear infections clear up on their own in a couple of days. Most often you will not need antibiotics. This is because many ear infections are caused by a virus. Antibiotics don't work against a virus. Regular doses of pain medicines are the best way to reduce your fever and help you feel better. Follow-up care is a key part of your treatment and safety. Be sure to make and go to all appointments, and call your doctor if you are having problems. It's also a good idea to know your test results and keep a list of the medicines you take. How can you care for yourself at home? · Take pain medicines exactly as directed. ? If the doctor gave you a prescription medicine for pain, take it as prescribed. ? If you are not taking a prescription pain medicine, take an over-the-counter medicine, such as acetaminophen (Tylenol), ibuprofen (Advil, Motrin), or naproxen (Aleve). Read and follow all instructions on the label. ? Do not take two or more pain medicines at the same time unless the doctor told you to. Many pain medicines have acetaminophen, which is Tylenol. Too much acetaminophen (Tylenol) can be harmful. · Plan to take a full dose of pain reliever before bedtime. Getting enough sleep will help you get better. · Try a warm, moist washcloth on the ear. It may help relieve pain. · If your doctor prescribed antibiotics, take them as directed. Do not stop taking them just because you feel better. You need to take the full course of antibiotics. When should you call for help?   Call your doctor now or seek immediate medical care if:    · You have new or increasing ear pain.     · You have new or increasing pus or blood draining from your ear.     · You have a fever with a stiff neck or a severe headache.    Watch closely for changes in your health, and be sure to contact your doctor if:    · You have new or worse symptoms.     · You are not getting better after taking an antibiotic for 2 days. Where can you learn more? Go to http://queta-boby.info/. Enter N792 in the search box to learn more about \"Ear Infection (Otitis Media): Care Instructions. \"  Current as of: March 28, 2018  Content Version: 11.8  © 0858-2988 [employer]. Care instructions adapted under license by [employer] (which disclaims liability or warranty for this information). If you have questions about a medical condition or this instruction, always ask your healthcare professional. [address] any warranty or liability for your use of this information. Sinusitis: Care Instructions  Your Care Instructions    Sinusitis is an infection of the lining of the sinus cavities in your head. Sinusitis often follows a cold. It causes pain and pressure in your head and face. In most cases, sinusitis gets better on its own in 1 to 2 weeks. But some mild symptoms may last for several weeks. Sometimes antibiotics are needed. Follow-up care is a key part of your treatment and safety. Be sure to make and go to all appointments, and call your doctor if you are having problems. It's also a good idea to know your test results and keep a list of the medicines you take. How can you care for yourself at home? · Take an over-the-counter pain medicine, such as acetaminophen (Tylenol), ibuprofen (Advil, Motrin), or naproxen (Aleve). Read and follow all instructions on the label. · If the doctor prescribed antibiotics, take them as directed. Do not stop taking them just because you feel better. You need to take the full course of antibiotics. · Be careful when taking over-the-counter cold or flu medicines and Tylenol at the same time. Many of these medicines have acetaminophen, which is Tylenol.  Read the labels to make sure that you are not taking more than the recommended dose. Too much acetaminophen (Tylenol) can be harmful. · Breathe warm, moist air from a steamy shower, a hot bath, or a sink filled with hot water. Avoid cold, dry air. Using a humidifier in your home may help. Follow the directions for cleaning the machine. · Use saline (saltwater) nasal washes to help keep your nasal passages open and wash out mucus and bacteria. You can buy saline nose drops at a grocery store or drugstore. Or you can make your own at home by adding 1 teaspoon of salt and 1 teaspoon of baking soda to 2 cups of distilled water. If you make your own, fill a bulb syringe with the solution, insert the tip into your nostril, and squeeze gently. [address] your nose. · Put a hot, wet towel or a warm gel pack on your face 3 or 4 times a day for 5 to 10 minutes each time. · Try a decongestant nasal spray like oxymetazoline (Afrin). Do not use it for more than 3 days in a row. Using it for more than 3 days can make your congestion worse. When should you call for help? Call your doctor now or seek immediate medical care if:    · You have new or worse swelling or redness in your face or around your eyes.     · You have a new or higher fever.    Watch closely for changes in your health, and be sure to contact your doctor if:    · You have new or worse facial pain.     · The mucus from your nose becomes thicker (like pus) or has new blood in it.     · You are not getting better as expected. Where can you learn more? Go to http://queta-boby.info/. Enter S103 in the search box to learn more about \"Sinusitis: Care Instructions. \"  Current as of: March 28, 2018  Content Version: 11.8  © 5763-7346 [employer]. Care instructions adapted under license by [employer] (which disclaims liability or warranty for this information).  If you have questions about a medical condition or this instruction, always ask your healthcare professional. [address] any warranty or liability for your use of this information.

## 2018-11-13 NOTE — PROGRESS NOTES
Harley Kayser is a 39 y.o. female who complains of congestion, sore throat, productive cough, left ear pressure and bilateral sinus pain for 3 days. She denies a history of chest pain, chills, fevers, myalgias and shortness of breath and denies a history of asthma. Patient admits to smoke cigarettes. Has not tried OTC meds. The history is provided by the patient.         Past Medical History:   Diagnosis Date    Arthritis     Cholesteatoma     Depression     [facility]    resolved  after gastric bypass surgery    GERD (gastroesophageal reflux disease)     mostly before gastric bypass    H/O gastric bypass     Ill-defined condition     anastomatic stricture    Morbid obesity (HCC)     Nausea & vomiting     with anesthesia    Panic attacks     PUD (peptic ulcer disease)         Past Surgical History:   Procedure Laterality Date    HX CARPAL TUNNEL RELEASE      right    HX CHOLECYSTECTOMY      HX GASTRIC BYPASS      2016    HX HEENT      3 surgeries - hole in eardrum x 2 - 2 surgeries to repair this/third surgery to removed cholesteatoma    HX ORTHOPAEDIC      plantar fascitis - right    HX OTHER SURGICAL      ulcer    HX TONSILLECTOMY      HX VASCULAR ACCESS      pt states she has not had this    MULTIPLE DELIVERY       x3         Family History   Problem Relation Age of Onset    Depression Mother     Other Mother         osteopenia    Depression Sister     Cancer Other         non-lymphatic leukemia    Heart Disease Paternal Uncle     Alzheimer Maternal Grandmother     Alzheimer Paternal Grandmother         Social History     Socioeconomic History    Marital status: LEGALLY      Spouse name: Not on file    Number of children: Not on file    Years of education: Not on file    Highest education level: Not on file   Social Needs    Financial resource strain: Not on file    Food insecurity - worry: Not on file    Food insecurity - inability: Not on file    Transportation needs - medical: Not on file   [employer] needs - non-medical: Not on file   Occupational History    Not on file   Tobacco Use    Smoking status: Current Every Day Smoker     Packs/day: 1.00    Smokeless tobacco: Never Used   Substance and Sexual Activity    Alcohol use: No     Comment: very rare/none per pt on 10/7/2016    Drug use: No    Sexual activity: Yes     Partners: Male   Other Topics Concern    Not on file   Social History Narrative    Not on file                ALLERGIES: Allegra [fexofenadine]    Review of Systems   Constitutional: Negative for activity change, appetite change, chills and fever. HENT: Positive for congestion, ear pain, rhinorrhea, sinus pressure, sinus pain and sore throat. Negative for trouble swallowing. Respiratory: Positive for cough. Negative for shortness of breath and wheezing. Cardiovascular: Negative for chest pain and palpitations. Gastrointestinal: Negative for nausea and vomiting. Musculoskeletal: Negative for myalgias. Neurological: Negative for dizziness and headaches. Hematological: Negative for adenopathy. Vitals:    11/13/18 0844   BP: 132/77   Pulse: 100   Resp: 16   Temp: 98 °F (36.7 °C)   SpO2: 97%   Weight: 212 lb (96.2 kg)   Height: 5' 2.5\" (1.588 m)       Physical Exam   Constitutional: She appears well-developed and well-nourished. No distress. HENT:   Right Ear: Tympanic membrane, external ear and ear canal normal.   Left Ear: External ear and ear canal normal. Tympanic membrane is erythematous and bulging. Nose: Rhinorrhea present. Right sinus exhibits maxillary sinus tenderness and frontal sinus tenderness. Left sinus exhibits maxillary sinus tenderness and frontal sinus tenderness. Mouth/Throat: Mucous membranes are normal. Posterior oropharyngeal erythema present. No oropharyngeal exudate, posterior oropharyngeal edema or tonsillar abscesses. Cardiovascular: Normal rate, regular rhythm and normal heart sounds. Pulmonary/Chest: Effort normal and breath sounds normal. No respiratory distress. She has no wheezes. She has no rales. Lymphadenopathy:     She has no cervical adenopathy. Neurological: She is alert. Skin: She is not diaphoretic. Psychiatric: She has a normal mood and affect. Her behavior is normal. Judgment and thought content normal.   Nursing note and vitals reviewed. [facility]    ICD-10-CM ICD-9-CM    1. Sinusitis, unspecified chronicity, unspecified location J32.9 473.9    2. Acute left otitis media H66.92 382.9      Medications Ordered Today   Medications    amoxicillin (AMOXIL) 875 mg tablet     Sig: Take 1 Tab by mouth every twelve (12) hours for 10 days. Dispense:  20 Tab     Refill:  0     The patients condition was discussed with the patient and they understand. The patient is to follow up with PCP [facility]. If signs and symptoms become worse the pt is to go to the ER. The patient is to take medications as prescribed.              Procedures

## 2019-04-24 ENCOUNTER — OFFICE VISIT (OUTPATIENT)
Dept: URGENT CARE | Age: 44
End: 2019-04-24

## 2019-04-24 VITALS
RESPIRATION RATE: 16 BRPM | OXYGEN SATURATION: 98 % | TEMPERATURE: 98.3 F | BODY MASS INDEX: 38.64 KG/M2 | WEIGHT: 210 LBS | HEIGHT: 62 IN | HEART RATE: 99 BPM | DIASTOLIC BLOOD PRESSURE: 70 MMHG | SYSTOLIC BLOOD PRESSURE: 132 MMHG

## 2019-04-24 DIAGNOSIS — J06.9 ACUTE URI: Primary | ICD-10-CM

## 2019-04-24 DIAGNOSIS — R05.8 ALLERGIC COUGH: ICD-10-CM

## 2019-04-24 PROBLEM — E66.01 SEVERE OBESITY (HCC): Status: ACTIVE | Noted: 2019-04-24

## 2019-04-24 RX ORDER — CODEINE PHOSPHATE AND GUAIFENESIN 10; 100 MG/5ML; MG/5ML
5 SOLUTION ORAL
Qty: 120 ML | Refills: 0 | Status: SHIPPED | OUTPATIENT
Start: 2019-04-24 | End: 2019-05-01

## 2019-04-24 RX ORDER — FLUTICASONE PROPIONATE 50 MCG
2 SPRAY, SUSPENSION (ML) NASAL DAILY
Qty: 1 BOTTLE | Refills: 0 | Status: SHIPPED | OUTPATIENT
Start: 2019-04-24 | End: 2020-07-10

## 2019-04-24 RX ORDER — BENZONATATE 200 MG/1
200 CAPSULE ORAL
Qty: 30 CAP | Refills: 0 | Status: SHIPPED | OUTPATIENT
Start: 2019-04-24 | End: 2019-05-01

## 2019-04-24 RX ORDER — PREDNISONE 10 MG/1
TABLET ORAL
Qty: 21 TAB | Refills: 0 | Status: SHIPPED | OUTPATIENT
Start: 2019-04-24 | End: 2020-07-10

## 2019-04-24 NOTE — PROGRESS NOTES
Cough   The history is provided by the patient. This is a new problem. The current episode started more than 2 days ago. The problem occurs every few minutes. The problem has not changed since onset. The cough is non-productive. There has been no fever. Associated symptoms include ear congestion, rhinorrhea and sore throat. Pertinent negatives include no chest pain, no shortness of breath and no wheezing. She has tried nothing for the symptoms. Her past medical history does not include asthma.         Past Medical History:   Diagnosis Date    Arthritis     Cholesteatoma     Depression     [facility] after [facility]    GERD (gastroesophageal reflux disease)     mostly before gastric bypass    H/O gastric bypass     Ill-defined condition     anastomatic stricture    Morbid obesity (HCC)     Nausea & vomiting     with anesthesia    Panic attacks     PUD (peptic ulcer disease)         Past Surgical History:   Procedure Laterality Date    HX CARPAL TUNNEL RELEASE      right    HX CHOLECYSTECTOMY      HX GASTRIC BYPASS      2016    HX HEENT      3 surgeries - hole in eardrum x 2 - 2 surgeries to repair this/third surgery to removed cholesteatoma    HX ORTHOPAEDIC      plantar fascitis - right    HX OTHER SURGICAL      ulcer    HX TONSILLECTOMY      HX VASCULAR ACCESS      pt states she has not had this    MULTIPLE DELIVERY       x3         Family History   Problem Relation Age of Onset    Depression Mother     Other Mother         osteopenia    Depression Sister     Cancer Other         non-lymphatic leukemia    Heart Disease Paternal Uncle     Alzheimer Maternal Grandmother     Alzheimer Paternal Grandmother         Social History     Socioeconomic History    Marital status: LEGALLY      Spouse name: Not on file    Number of children: Not on file    Years of education: Not on file   Leo Johnson Highest education level: Not on file   Occupational History    Not on file   Social Needs    Financial resource strain: Not on file    Food insecurity:     Worry: Not on file     Inability: Not on file    Transportation needs:     Medical: Not on file     Non-medical: Not on file   Tobacco Use    Smoking status: Current Every Day Smoker     Packs/day: 1.00    Smokeless tobacco: Never Used   Substance and Sexual Activity    Alcohol use: No     Comment: very rare/none per pt on 10/7/2016    Drug use: No    Sexual activity: Yes     Partners: Male   Lifestyle    Physical activity:     Days per week: Not on file     Minutes per session: Not on file    Stress: Not on file   Relationships    Social connections:     Talks on phone: Not on file     Gets together: Not on file     Attends [employer] service: Not on file     Active member of club or organization: Not on file     Attends meetings of clubs or organizations: Not on file     Relationship status: Not on file    Intimate partner violence:     Fear of current or ex partner: Not on file     Emotionally abused: Not on file     Physically abused: Not on file     Forced sexual activity: Not on file   Other Topics Concern    Not on file   Social History Narrative    Not on file                ALLERGIES: Allegra [fexofenadine]    Review of Systems   HENT: Positive for congestion, postnasal drip, rhinorrhea, sneezing and sore throat. Respiratory: Positive for cough. Negative for shortness of breath and wheezing. Cardiovascular: Negative for chest pain. Vitals:    04/24/19 1802   BP: 132/70   Pulse: 99   Resp: 16   Temp: 98.3 °F (36.8 °C)   SpO2: 98%   Weight: 210 lb (95.3 kg)   Height: 5' 2\" (1.575 m)       Physical Exam   Constitutional: No distress.    HENT:   Right Ear: Tympanic membrane and ear canal normal.   Left Ear: Tympanic membrane and ear canal normal.   Nose: Nose normal.   Mouth/Throat: No oropharyngeal exudate, posterior oropharyngeal edema or posterior oropharyngeal erythema. Eyes: Conjunctivae are normal. Right eye exhibits no discharge. Left eye exhibits no discharge. Neck: Neck supple. Pulmonary/Chest: Effort normal and breath sounds normal. No respiratory distress. She has no wheezes. She has no rales. Lymphadenopathy:     She has no cervical adenopathy. Skin: No rash noted. Nursing note and vitals reviewed. MDM    Procedures        ICD-10-CM ICD-9-CM    1. Acute URI J06.9 465.9    2. Allergic cough R05 786.2 guaiFENesin-codeine (ROBITUSSIN AC) 100-10 mg/5 mL solution     Medications Ordered Today   Medications    predniSONE (STERAPRED DS) 10 mg dose pack     Sig: As directed     Dispense:  21 Tab     Refill:  0    benzonatate (TESSALON) 200 mg capsule     Sig: Take 1 Cap by mouth three (3) times daily as needed for Cough for up to 7 days. Dispense:  30 Cap     Refill:  0    guaiFENesin-codeine (ROBITUSSIN AC) 100-10 mg/5 mL solution     Sig: Take 5 mL by mouth three (3) times daily as needed for Cough for up to 7 days. Max Daily Amount: 15 mL. Dispense:  120 mL     Refill:  0    fluticasone propionate (FLONASE) 50 mcg/actuation nasal spray     Si Sprays by Both Nostrils route daily. Dispense:  1 Bottle     Refill:  0     No results found for any visits on 19. The patients condition was discussed with the patient and they understand. The patient is to follow up with primary care doctor. If signs and symptoms become worse the pt is to go to the ER. The patient is to take medications as prescribed.

## 2019-04-27 ENCOUNTER — APPOINTMENT (OUTPATIENT)
Dept: GENERAL RADIOLOGY | Age: 44
End: 2019-04-27
Attending: EMERGENCY MEDICINE
Payer: MEDICAID

## 2019-04-27 ENCOUNTER — HOSPITAL ENCOUNTER (EMERGENCY)
Age: 44
Discharge: HOME | End: 2019-04-27
Attending: EMERGENCY MEDICINE
Payer: MEDICAID

## 2019-04-27 VITALS
DIASTOLIC BLOOD PRESSURE: 70 MMHG | BODY MASS INDEX: 37.49 KG/M2 | HEART RATE: 97 BPM | WEIGHT: 203.71 LBS | TEMPERATURE: 98.1 F | RESPIRATION RATE: 16 BRPM | SYSTOLIC BLOOD PRESSURE: 108 MMHG | HEIGHT: 62 IN | OXYGEN SATURATION: 98 %

## 2019-04-27 DIAGNOSIS — J18.9 COMMUNITY ACQUIRED PNEUMONIA OF RIGHT LOWER LOBE OF LUNG: Primary | ICD-10-CM

## 2019-04-27 DIAGNOSIS — J98.01 ACUTE BRONCHOSPASM: ICD-10-CM

## 2019-04-27 DIAGNOSIS — E11.9 TYPE 2 DIABETES MELLITUS WITHOUT COMPLICATION, WITHOUT LONG-TERM CURRENT USE OF INSULIN (HCC): ICD-10-CM

## 2019-04-27 DIAGNOSIS — F17.200 TOBACCO DEPENDENCE: ICD-10-CM

## 2019-04-27 PROCEDURE — 94640 AIRWAY INHALATION TREATMENT: CPT

## 2019-04-27 PROCEDURE — 99283 EMERGENCY DEPT VISIT LOW MDM: CPT

## 2019-04-27 PROCEDURE — 71046 X-RAY EXAM CHEST 2 VIEWS: CPT

## 2019-04-27 PROCEDURE — 74011250637 HC RX REV CODE- 250/637: Performed by: PHYSICIAN ASSISTANT

## 2019-04-27 PROCEDURE — 74011000250 HC RX REV CODE- 250: Performed by: PHYSICIAN ASSISTANT

## 2019-04-27 PROCEDURE — 77030029684 HC NEB SM VOL KT MONA -A

## 2019-04-27 RX ORDER — LEVOFLOXACIN 750 MG/1
750 TABLET ORAL
Status: COMPLETED | OUTPATIENT
Start: 2019-04-27 | End: 2019-04-27

## 2019-04-27 RX ORDER — NEBULIZER AND COMPRESSOR
1 EACH MISCELLANEOUS
Qty: 1 EACH | Refills: 0 | Status: SHIPPED | OUTPATIENT
Start: 2019-04-27 | End: 2019-05-07

## 2019-04-27 RX ORDER — IPRATROPIUM BROMIDE AND ALBUTEROL SULFATE 2.5; .5 MG/3ML; MG/3ML
3 SOLUTION RESPIRATORY (INHALATION)
Qty: 30 NEBULE | Refills: 0 | Status: SHIPPED | OUTPATIENT
Start: 2019-04-27 | End: 2019-05-07

## 2019-04-27 RX ORDER — IPRATROPIUM BROMIDE AND ALBUTEROL SULFATE 2.5; .5 MG/3ML; MG/3ML
3 SOLUTION RESPIRATORY (INHALATION)
Status: COMPLETED | OUTPATIENT
Start: 2019-04-27 | End: 2019-04-27

## 2019-04-27 RX ORDER — LEVOFLOXACIN 750 MG/1
750 TABLET ORAL DAILY
Qty: 6 TAB | Refills: 0 | Status: SHIPPED | OUTPATIENT
Start: 2019-04-27 | End: 2019-05-03

## 2019-04-27 RX ORDER — ALBUTEROL SULFATE 90 UG/1
2 AEROSOL, METERED RESPIRATORY (INHALATION)
Qty: 1 INHALER | Refills: 0 | Status: SHIPPED | OUTPATIENT
Start: 2019-04-27 | End: 2019-05-11

## 2019-04-27 RX ADMIN — LEVOFLOXACIN 750 MG: 750 TABLET, FILM COATED ORAL at 10:49

## 2019-04-27 RX ADMIN — IPRATROPIUM BROMIDE AND ALBUTEROL SULFATE 3 ML: .5; 3 SOLUTION RESPIRATORY (INHALATION) at 10:35

## 2019-04-27 NOTE — ED PROVIDER NOTES
EMERGENCY DEPARTMENT HISTORY AND PHYSICAL EXAM 
 
 
Date: 4/27/2019 Patient Name: Chano Casas History of Presenting Illness Chief Complaint Patient presents with  Cough  
  reports a couple weeks ago she started with a cough, seen at [facility] on 4/24/19 and given allergy medicines  Fever History Provided By: Patient HPI: Chano Casas, 39 y.o. female presents ambulatory to the ED with c/o 2 week h/o cough, congestion and fever. He was reportedly seen at the [address] [place] [address] and given allergy medications, cough preparations and steroids. She is a smoker. She denied h/o chronic bronchitis/Asthma/COPD. She denied chest pain. She states cough has caused significant sleep interruption. No abdominal pain. No N/V/D. No rash/lesion. She denied ill contacts. She believes her symptoms were brought on by the pollen. Chief Complaint: cough, congestion Duration: 2 Weeks Timing:  Acute Location: chest 
Quality: Aching and Tightness Severity: Moderate Modifying Factors: h/o seasonal allergies and tobacco dependence Associated Symptoms: fever There are no other complaints, changes, or physical findings at this time. PCP: Clinton Otto MD 
 
Current Outpatient Medications Medication Sig Dispense Refill  levoFLOXacin (LEVAQUIN) 750 mg tablet Take 1 Tab by mouth daily for 6 days. 6 Tab 0  
 albuterol (PROVENTIL HFA, VENTOLIN HFA, PROAIR HFA) 90 mcg/actuation inhaler Take 2 Puffs by inhalation every six (6) hours as needed for Shortness of Breath (cough) for up to 14 days. 1 Inhaler 0  
 Nebulizer & Compressor machine 1 Each by Does Not Apply route every four to six (4-6) hours as needed for Cough for up to 10 days. 1 Each 0  
 albuterol-ipratropium (DUO-NEB) 2.5 mg-0.5 mg/3 ml nebu 3 mL by Nebulization route every four (4) hours as needed for Cough for up to 10 days.  30 Nebule 0  
 predniSONE (STERAPRED DS) 10 mg dose pack As directed 21 Tab 0  
  benzonatate (TESSALON) 200 mg capsule Take 1 Cap by mouth three (3) times daily as needed for Cough for up to 7 days. 30 Cap 0  
 guaiFENesin-codeine (ROBITUSSIN AC) 100-10 mg/5 mL solution Take 5 mL by mouth three (3) times daily as needed for Cough for up to 7 days. Max Daily Amount: 15 mL. 120 mL 0  
 fluticasone propionate (FLONASE) 50 mcg/actuation nasal spray 2 Sprays by Both Nostrils route daily. 1 Bottle 0  
 ALPRAZolam (XANAX) 1 mg tablet Take 1 mg by mouth two (2) times a day.  cholecalciferol (VITAMIN D3) 5,000 unit capsule Take 5,000 Units by mouth daily.  diphenhydrAMINE (ALLERGY RELIEF,DIPHENHYDRAMIN,) 25 mg capsule Take 25 mg by mouth daily.  venlafaxine-SR (EFFEXOR XR) 75 mg capsule Take 75 mg by mouth daily.  INTRAUTERINE DEVICE, IUD, IU by IntraUTERine route.  PANTOPRAZOLE SODIUM (PROTONIX PO) Take 40 mg by mouth daily. Past History Past Medical History: 
Past Medical History:  
Diagnosis Date  Arthritis  Cholesteatoma  Depression  DM type 2 (diabetes mellitus, type 2) ([address] 2009 after gastric bypass surgery  GERD (gastroesophageal reflux disease)   
 mostly before gastric bypass  H/O gastric bypass  Ill-defined condition   
 anastomatic stricture  Morbid obesity ([address]  Nausea & vomiting   
 with anesthesia  Panic attacks  PUD (peptic ulcer disease) Past Surgical History: 
Past Surgical History:  
Procedure Laterality Date  HX CARPAL TUNNEL RELEASE    
 right  HX CHOLECYSTECTOMY  HX GASTRIC BYPASS 2016  HX HEENT    
 3 surgeries - hole in eardrum x 2 - 2 surgeries to repair this/third surgery to removed cholesteatoma  HX ORTHOPAEDIC    
 plantar fascitis - right  HX OTHER SURGICAL    
 ulcer  HX TONSILLECTOMY  HX VASCULAR ACCESS    
 pt states she has not had this  MULTIPLE DELIVERY     
 x3 Family History: 
Family History Problem Relation Age of Onset  Depression Mother  Other Mother   
     osteopenia  Depression Sister  Cancer Other   
     non-lymphatic leukemia  Heart Disease Paternal Uncle  Alzheimer Maternal Grandmother  Alzheimer Paternal Grandmother Social History: 
Social History Tobacco Use  Smoking status: Current Every Day Smoker Packs/day: 1.00  Smokeless tobacco: Never Used Substance Use Topics  Alcohol use: No  
  Comment: very rare/none per pt on 10/7/2016  Drug use: No  
 
 
Allergies: Allergies Allergen Reactions  Allegra [Fexofenadine] Hives Review of Systems Review of Systems Constitutional: Positive for fever. Negative for chills. HENT: Positive for congestion and postnasal drip. Negative for rhinorrhea and sore throat. Eyes: Negative for discharge, redness and itching. Respiratory: Positive for cough, chest tightness and shortness of breath. Cardiovascular: Negative for chest pain and palpitations. Gastrointestinal: Negative for abdominal pain, diarrhea, nausea and vomiting. Endocrine: Negative for polydipsia, polyphagia and polyuria. Genitourinary: Negative for dysuria and hematuria. Musculoskeletal: Negative for neck pain and neck stiffness. Skin: Negative for rash and wound. Allergic/Immunologic: Negative for food allergies and immunocompromised state. Neurological: Negative for dizziness, weakness and headaches. Hematological: Negative for adenopathy. Does not bruise/bleed easily. Psychiatric/Behavioral: Negative for agitation and confusion. Physical Exam  
Physical Exam  
Constitutional: She is oriented to person, place, and time. She appears well-developed and well-nourished. No distress. WDWN female, alert, in NAD HENT:  
Head: Normocephalic and atraumatic. Mouth/Throat: No oropharyngeal exudate.   
Boggy nasal mucosa, clear rhinorrhea, posterior oropharynx injected without exudate. Increased effusion noted to bilat TMs, no erythema, good light reflex noted. Eyes: Pupils are equal, round, and reactive to light. Conjunctivae and EOM are normal. Right eye exhibits no discharge. Left eye exhibits no discharge. No scleral icterus. Neck: Normal range of motion. Neck supple. No JVD present. No tracheal deviation present. No thyromegaly present. Cardiovascular: Normal rate, regular rhythm and normal heart sounds. Pulmonary/Chest: Effort normal and breath sounds normal. No respiratory distress. She has no wheezes. She has no rales. Moderate nonproductive cough noted during exam, no appreciable wheezes Abdominal: Soft. She exhibits no distension. There is no tenderness. There is no rebound and no guarding. No CVAT Musculoskeletal: Normal range of motion. She exhibits no edema. Lymphadenopathy:  
  She has no cervical adenopathy. Neurological: She is alert and oriented to person, place, and time. She exhibits normal muscle tone. Coordination normal.  
Skin: Skin is warm and dry. She is not diaphoretic. No pallor. Psychiatric: She has a normal mood and affect. Her behavior is normal. Judgment normal.  
Nursing note and vitals reviewed. Diagnostic Study Results Labs - No results found for this or any previous visit (from the past 12 hour(s)). Radiologic Studies -  
XR CHEST PA LAT Final Result IMPRESSION: There is interstitial disease in the right lower lobe and possibly  
in the right middle lobe. This is a change when compared to the previous study  
and may represent an area of acute process developing. CXR Results  (Last 48 hours) 04/27/19 1023  XR CHEST PA LAT Final result Impression:  IMPRESSION: There is interstitial disease in the right lower lobe and possibly  
in the right middle lobe. This is a change when compared to the previous study  
and may represent an area of acute process developing. Narrative:  EXAM: XR CHEST PA LAT INDICATION: cough COMPARISON: 9/16/2012. FINDINGS: PA and lateral radiographs of the chest demonstrate increased  
interstitial densities in the right lower lobe and possibly right middle lobe. Minimal increase is also noted at the left lung base. No pleural effusion. There  
is no airspace consolidation. . The cardiac and mediastinal contours and  
pulmonary vascularity are normal. The bones and soft tissues are within normal  
limits. Medical Decision Making I am the first provider for this patient. I reviewed the vital signs, available nursing notes, past medical history, past surgical history, family history and social history. Vital Signs-Reviewed the patient's vital signs. Patient Vitals for the past 12 hrs: 
 Temp Pulse Resp BP SpO2  
04/27/19 1010 98.1 °F (36.7 °C) 97 16 108/70 98 % Records Reviewed: Nursing Notes, Old Medical Records, Previous Radiology Studies and Previous Laboratory Studies Provider Notes (Medical Decision Making): URI, bronchitis, bronchospasm, PNA, RAD 
 
 
ED Course:  
Initial assessment performed. The patients presenting problems have been discussed, and they are in agreement with the care plan formulated and outlined with them. I have encouraged them to ask questions as they arise throughout their visit. TOBACCO DEPENDENCE COUNSELING The patient was counseled on the dangers of tobacco use, and was advised to quit. Reviewed strategies to maximize success, including removing cigarettes and smoking materials from environment, stress management, substitution of other forms of reinforcement, support of family/friends and written materials. DISCHARGE NOTE: 
The care plan has been outline with the patient and/or family, who verbally conveyed understanding and agreement. Available results have been reviewed.  Patient and/or family understand the follow up plan as outlined and discharge instructions. Should their condition deterioration at any time after discharge the patient agrees to return, follow up sooner than outlined or seek medical assistance at the closest Emergency Room as soon as possible. Questions have been answered. Discharge instructions and educational information regarding the patient's diagnosis as well a list of reasons why the patient would want to seek immediate medical attention, should their condition change, were reviewed directly with the patient/family PLAN: 
1. Discharge Medication List as of 4/27/2019 10:43 AM  
  
START taking these medications Details  
levoFLOXacin (LEVAQUIN) 750 mg tablet Take 1 Tab by mouth daily for 6 days. , Print, Disp-6 Tab, R-0  
  
albuterol (PROVENTIL HFA, VENTOLIN HFA, PROAIR HFA) 90 mcg/actuation inhaler Take 2 Puffs by inhalation every six (6) hours as needed for Shortness of Breath (cough) for up to 14 days. , Print, Disp-1 Inhaler, R-0 Nebulizer & Compressor machine 1 Each by Does Not Apply route every four to six (4-6) hours as needed for Cough for up to 10 days. , Print, Disp-1 Each, R-0  
  
albuterol-ipratropium (DUO-NEB) 2.5 mg-0.5 mg/3 ml nebu 3 mL by Nebulization route every four (4) hours as needed for Cough for up to 10 days. , Print, Disp-30 Nebule, R-0  
  
  
CONTINUE these medications which have NOT CHANGED Details  
predniSONE (STERAPRED DS) 10 mg dose pack As directed, Normal, Disp-21 Tab, R-0  
  
benzonatate (TESSALON) 200 mg capsule Take 1 Cap by mouth three (3) times daily as needed for Cough for up to 7 days. , Normal, Disp-30 Cap, R-0  
  
guaiFENesin-codeine (ROBITUSSIN AC) 100-10 mg/5 mL solution Take 5 mL by mouth three (3) times daily as needed for Cough for up to 7 days. Max Daily Amount: 15 mL. , Print, Disp-120 mL, R-0  
  
fluticasone propionate (FLONASE) 50 mcg/actuation nasal spray 2 Sprays by Both Nostrils route daily. , Normal, Disp-1 Bottle, R-0  
  
 ALPRAZolam (XANAX) 1 mg tablet Take 1 mg by mouth two (2) times a day., Historical Med  
  
cholecalciferol (VITAMIN D3) 5,000 unit capsule Take 5,000 Units by mouth daily. , Historical Med  
  
diphenhydrAMINE (ALLERGY RELIEF,DIPHENHYDRAMIN,) 25 mg capsule Take 25 mg by mouth daily. , Historical Med  
  
venlafaxine-SR (EFFEXOR XR) 75 mg capsule Take 75 mg by mouth daily. , Historical Med INTRAUTERINE DEVICE, IUD, IU by IntraUTERine route., Historical Med PANTOPRAZOLE SODIUM (PROTONIX PO) Take 40 mg by mouth daily. , Historical Med 2. Follow-up Information Follow up With Specialties Details Why Contact Info Irma Hyatt MD [address] 
[address] [facility] 
[phone] [facility] EMERGENCY DEPT Emergency Medicine  If symptoms worsen [address] [facility] 
[phone] Return to ED if worse Diagnosis Clinical Impression: 1. Community acquired pneumonia of right lower lobe of lung ([address] 2. Acute bronchospasm 3. Type 2 diabetes mellitus without complication, without long-term current use of insulin ([address] 4. Tobacco dependence

## 2019-04-27 NOTE — LETTER
[address] 
[facility] EMERGENCY DEPT 
[address] [place] [address] [address] [phone] 
[phone] Work/School Note Date: 4/27/2019 To Whom It May concern: 
 
Kassy Elizondo was seen and treated today in the emergency room. She may return to work in 3 days, as symptoms improve. Sincerely, Dung Renteria

## 2019-04-27 NOTE — DISCHARGE INSTRUCTIONS
Rest, push fluids, follow up with primary care for recheck. Tylenol/Motrin as needed for fever. Avoid tobacco.  Return to the Emergency Dept for any worsening cough, congestion, chest pain, shortness of breath or fever.

## 2019-04-27 NOTE — ED NOTES
BERNARDA Hatfield reviewed discharge instructions with the patient. The patient verbalized understanding. Patient ambulatory out of ED with steady gait. No further complaints noted.

## 2020-07-10 RX ORDER — SUCRALFATE 1 G/1
1 TABLET ORAL 4 TIMES DAILY
COMMUNITY
End: 2021-06-11

## 2020-07-17 ENCOUNTER — HOSPITAL ENCOUNTER (OUTPATIENT)
Dept: PREADMISSION TESTING | Age: 45
Discharge: HOME | End: 2020-07-17
Payer: MEDICAID

## 2020-07-17 DIAGNOSIS — U07.1 COVID-19: ICD-10-CM

## 2020-07-17 PROCEDURE — 87635 SARS-COV-2 COVID-19 AMP PRB: CPT

## 2020-07-19 LAB — SARS-COV-2, COV2NT: NOT DETECTED

## 2020-07-21 ENCOUNTER — ANESTHESIA EVENT (OUTPATIENT)
Dept: ENDOSCOPY | Age: 45
End: 2020-07-21
Payer: MEDICAID

## 2020-07-21 ENCOUNTER — HOSPITAL ENCOUNTER (OUTPATIENT)
Age: 45
Setting detail: OUTPATIENT SURGERY
Discharge: HOME | End: 2020-07-21
Attending: INTERNAL MEDICINE | Admitting: INTERNAL MEDICINE
Payer: MEDICAID

## 2020-07-21 ENCOUNTER — ANESTHESIA (OUTPATIENT)
Dept: ENDOSCOPY | Age: 45
End: 2020-07-21
Payer: MEDICAID

## 2020-07-21 VITALS
SYSTOLIC BLOOD PRESSURE: 112 MMHG | BODY MASS INDEX: 43.77 KG/M2 | HEIGHT: 63 IN | HEART RATE: 95 BPM | RESPIRATION RATE: 24 BRPM | TEMPERATURE: 98.1 F | WEIGHT: 247 LBS | DIASTOLIC BLOOD PRESSURE: 72 MMHG | OXYGEN SATURATION: 96 %

## 2020-07-21 PROCEDURE — 74011250636 HC RX REV CODE- 250/636: Performed by: NURSE ANESTHETIST, CERTIFIED REGISTERED

## 2020-07-21 PROCEDURE — 77030018712 HC DEV BLLN INFL BSC -B: Performed by: INTERNAL MEDICINE

## 2020-07-21 PROCEDURE — 76040000019: Performed by: INTERNAL MEDICINE

## 2020-07-21 PROCEDURE — C1726 CATH, BAL DIL, NON-VASCULAR: HCPCS | Performed by: INTERNAL MEDICINE

## 2020-07-21 PROCEDURE — 74011000250 HC RX REV CODE- 250: Performed by: NURSE ANESTHETIST, CERTIFIED REGISTERED

## 2020-07-21 PROCEDURE — 76060000031 HC ANESTHESIA FIRST 0.5 HR: Performed by: INTERNAL MEDICINE

## 2020-07-21 RX ORDER — ATROPINE SULFATE 0.1 MG/ML
0.5 INJECTION INTRAVENOUS
Status: DISCONTINUED | OUTPATIENT
Start: 2020-07-21 | End: 2020-07-21 | Stop reason: HOSPADM

## 2020-07-21 RX ORDER — SODIUM CHLORIDE 0.9 % (FLUSH) 0.9 %
5-40 SYRINGE (ML) INJECTION AS NEEDED
Status: DISCONTINUED | OUTPATIENT
Start: 2020-07-21 | End: 2020-07-21 | Stop reason: HOSPADM

## 2020-07-21 RX ORDER — SODIUM CHLORIDE 0.9 % (FLUSH) 0.9 %
5-40 SYRINGE (ML) INJECTION EVERY 8 HOURS
Status: DISCONTINUED | OUTPATIENT
Start: 2020-07-21 | End: 2020-07-21 | Stop reason: HOSPADM

## 2020-07-21 RX ORDER — MIDAZOLAM HYDROCHLORIDE 1 MG/ML
.25-5 INJECTION, SOLUTION INTRAMUSCULAR; INTRAVENOUS
Status: DISCONTINUED | OUTPATIENT
Start: 2020-07-21 | End: 2020-07-21 | Stop reason: HOSPADM

## 2020-07-21 RX ORDER — EPINEPHRINE 0.1 MG/ML
1 INJECTION INTRACARDIAC; INTRAVENOUS
Status: DISCONTINUED | OUTPATIENT
Start: 2020-07-21 | End: 2020-07-21 | Stop reason: HOSPADM

## 2020-07-21 RX ORDER — SODIUM CHLORIDE 9 MG/ML
150 INJECTION, SOLUTION INTRAVENOUS CONTINUOUS
Status: DISCONTINUED | OUTPATIENT
Start: 2020-07-21 | End: 2020-07-21 | Stop reason: HOSPADM

## 2020-07-21 RX ORDER — LIDOCAINE HYDROCHLORIDE 20 MG/ML
INJECTION, SOLUTION EPIDURAL; INFILTRATION; INTRACAUDAL; PERINEURAL AS NEEDED
Status: DISCONTINUED | OUTPATIENT
Start: 2020-07-21 | End: 2020-07-21 | Stop reason: HOSPADM

## 2020-07-21 RX ORDER — SODIUM CHLORIDE 9 MG/ML
INJECTION, SOLUTION INTRAVENOUS
Status: DISCONTINUED | OUTPATIENT
Start: 2020-07-21 | End: 2020-07-21 | Stop reason: HOSPADM

## 2020-07-21 RX ORDER — PROPOFOL 10 MG/ML
INJECTION, EMULSION INTRAVENOUS AS NEEDED
Status: DISCONTINUED | OUTPATIENT
Start: 2020-07-21 | End: 2020-07-21 | Stop reason: HOSPADM

## 2020-07-21 RX ORDER — DEXTROMETHORPHAN/PSEUDOEPHED 2.5-7.5/.8
1.2 DROPS ORAL
Status: DISCONTINUED | OUTPATIENT
Start: 2020-07-21 | End: 2020-07-21 | Stop reason: HOSPADM

## 2020-07-21 RX ORDER — FENTANYL CITRATE 50 UG/ML
200 INJECTION, SOLUTION INTRAMUSCULAR; INTRAVENOUS
Status: DISCONTINUED | OUTPATIENT
Start: 2020-07-21 | End: 2020-07-21 | Stop reason: HOSPADM

## 2020-07-21 RX ADMIN — PROPOFOL 40 MG: 10 INJECTION, EMULSION INTRAVENOUS at 07:39

## 2020-07-21 RX ADMIN — PROPOFOL 50 MG: 10 INJECTION, EMULSION INTRAVENOUS at 07:40

## 2020-07-21 RX ADMIN — LIDOCAINE HYDROCHLORIDE 40 MG: 20 INJECTION, SOLUTION EPIDURAL; INFILTRATION; INTRACAUDAL; PERINEURAL at 07:34

## 2020-07-21 RX ADMIN — PROPOFOL 50 MG: 10 INJECTION, EMULSION INTRAVENOUS at 07:38

## 2020-07-21 RX ADMIN — SODIUM CHLORIDE: 900 INJECTION, SOLUTION INTRAVENOUS at 07:30

## 2020-07-21 RX ADMIN — PROPOFOL 100 MG: 10 INJECTION, EMULSION INTRAVENOUS at 07:35

## 2020-07-21 NOTE — ANESTHESIA PREPROCEDURE EVALUATION
Relevant Problems   No relevant active problems       Anesthetic History     PONV          Review of Systems / Medical History  Patient summary reviewed, nursing notes reviewed and pertinent labs reviewed    Pulmonary  Within defined limits                 Neuro/Psych         Psychiatric history     Cardiovascular  Within defined limits                Exercise tolerance: >4 METS     GI/Hepatic/Renal     GERD      PUD     Endo/Other        Morbid obesity     Other Findings   Comments: S/p gastric bypass          Physical Exam    Airway  Mallampati: II  TM Distance: 4 - 6 cm  Neck ROM: normal range of motion   Mouth opening: Normal     Cardiovascular  Regular rate and rhythm,  S1 and S2 normal,  no murmur, click, rub, or gallop             Dental         Pulmonary  Breath sounds clear to auscultation               Abdominal  GI exam deferred       Other Findings            Anesthetic Plan    ASA: 3  Anesthesia type: general          Induction: Intravenous  Anesthetic plan and risks discussed with: Patient

## 2020-07-21 NOTE — ANESTHESIA POSTPROCEDURE EVALUATION
Post-Anesthesia Evaluation and Assessment    Patient: Agustín Martinez MRN: [id number]  SSN: [id number]    YOB: 1975  Age: 38 y.o. Sex: female      I have evaluated the patient and they are stable and ready for discharge from the PACU. Cardiovascular Function/Vital Signs  Visit Vitals  /72   Pulse 95   Temp 36.7 °C (98.1 °F)   Resp 24   Ht 5' 2.5\" (1.588 m)   Wt 112 kg (247 lb)   SpO2 96%   Breastfeeding No   BMI 44.46 kg/m²       Patient is status post MAC anesthesia for Procedure(s):  ESOPHAGOGASTRODUODENOSCOPY (EGD)   :-  ESOPHAGEAL DILATION. Nausea/Vomiting: None    Postoperative hydration reviewed and adequate. Pain:  Pain Scale 1: Numeric (0 - 10) (07/21/20 0811)  Pain Intensity 1: 0 (07/21/20 0107)   Managed    Neurological Status: At baseline    Mental Status, Level of Consciousness: Alert and  oriented to person, place, and time    Pulmonary Status:   O2 Device: Room air (07/21/20 0811)   Adequate oxygenation and airway patent    Complications related to anesthesia: None    Post-anesthesia assessment completed. No concerns    Signed By: Arron Montoya MD     July 21, 2020              Procedure(s):  ESOPHAGOGASTRODUODENOSCOPY (EGD)   :-  ESOPHAGEAL DILATION. general    <BSHSIANPOST>    INITIAL Post-op Vital signs:   Vitals Value Taken Time   /72 7/21/2020  8:11 AM   Temp 36.7 °C (98.1 °F) 7/21/2020  7:49 AM   Pulse 98 7/21/2020  8:15 AM   Resp 0 7/21/2020  8:16 AM   SpO2 96 % 7/21/2020  8:15 AM   Vitals shown include unvalidated device data.

## 2020-07-21 NOTE — H&P
[address], [address] [place] [address]          Pre-procedure History and Physical       NAME:  Taz Hammond   :   1975   MRN:   [id number]     CHIEF COMPLAINT/HPI: See procedure note    PMH:  Past Medical History:   Diagnosis Date    Arthritis     Cholesteatoma     Chronic pain     arthritis    Depression     DM type 2 (diabetes mellitus, type 2) ([facility] 2009 2016 after gastric bypass surgery    GERD (gastroesophageal reflux disease)     mostly before gastric bypass/has again now    H/O gastric bypass     Ill-defined condition     anastomatic stricture    Morbid obesity (HCC)     Nausea & vomiting     with anesthesia    Nicotine vapor product user     Panic attacks     PUD (peptic ulcer disease)        PSH:  Past Surgical History:   Procedure Laterality Date    HX CARPAL TUNNEL RELEASE      right    HX CHOLECYSTECTOMY      HX GASTRIC BYPASS      2016    HX HEENT      3 surgeries - hole in eardrum x 2 - 2 surgeries to repair this/third surgery to removed cholesteatoma    HX ORTHOPAEDIC      plantar fasciitis - right    HX OTHER SURGICAL      EGD - ulcer/dilatation    HX TONSILLECTOMY      HX VASCULAR ACCESS      pt states she has not had this    MULTIPLE DELIVERY       x3       Allergies: Allergies   Allergen Reactions    Allegra [Fexofenadine] Hives       Home Medications:  Prior to Admission Medications   Prescriptions Last Dose Informant Patient Reported? Taking? ALPRAZolam (XANAX) 1 mg tablet 2020 at Unknown time  Yes Yes   Sig: Take 1 mg by mouth two (2) times daily as needed. INTRAUTERINE DEVICE, IUD, IU   Yes Yes   Sig: by IntraUTERine route. PANTOPRAZOLE SODIUM (PROTONIX PO) 2020 at Unknown time  Yes Yes   Sig: Take 40 mg by mouth daily. diphenhydrAMINE (ALLERGY RELIEF,DIPHENHYDRAMIN,) 25 mg capsule 2020 at Unknown time  Yes Yes   Sig: Take 25 mg by mouth daily.    sucralfate (Carafate) 1 gram tablet 7/20/2020 at Unknown time  Yes Yes   Sig: Take 1 g by mouth four (4) times daily. venlafaxine-SR (EFFEXOR XR) 75 mg capsule 7/20/2020 at Unknown time  Yes Yes   Sig: Take 75 mg by mouth daily. Facility-Administered Medications: None       Hospital Medications:  Current Facility-Administered Medications   Medication Dose Route Frequency    0.9% sodium chloride infusion  150 mL/hr IntraVENous CONTINUOUS    sodium chloride (NS) flush 5-40 mL  5-40 mL IntraVENous Q8H    sodium chloride (NS) flush 5-40 mL  5-40 mL IntraVENous PRN    midazolam (VERSED) injection 0.25-5 mg  0.25-5 mg IntraVENous Multiple    fentaNYL citrate (PF) injection 200 mcg  200 mcg IntraVENous Multiple    simethicone (MYLICON) 91VU/7.7UN oral drops 80 mg  1.2 mL Oral Multiple    atropine injection 0.5 mg  0.5 mg IntraVENous ONCE PRN    EPINEPHrine (ADRENALIN) 0.1 mg/mL syringe 1 mg  1 mg Endoscopically ONCE PRN       Family History:  Family History   Problem Relation Age of Onset    Depression Mother     Other Mother         osteopenia/polycythemia vera/fibormyalgia    Depression Sister     Cancer Other         non-lymphocytic leukemia    Heart Disease Paternal Uncle     Alzheimer Maternal Grandmother     Alzheimer Paternal Grandmother     Dementia Maternal Aunt        Social History:  Social History     Tobacco Use    Smoking status: Current Every Day Smoker     Packs/day: 1.00    Smokeless tobacco: Never Used   Substance Use Topics    Alcohol use: Yes     Comment: very rare/none per pt on 10/7/2016/1-2 times per yr per pt on 7/10/2020       The patient was counseled at length about the risks of lizzy Covid-19 in the jackie-operative and post-operative states including the recovery window of their procedure.   The patient was made aware that lizzy Covid-19 after a surgical procedure may worsen their prognosis for recovering from the virus and lend to a higher morbidity and or mortality risk.  The patient was given the options of postponing their procedure. All of the risks, benefits, and alternatives were discussed. The patient does  wish to proceed with the procedure. PHYSICAL EXAM PRIOR TO SEDATION:  General: Alert, in no acute distress    Lungs:            CTA bilaterally  Heart:  Normal S1, S2    Abdomen: Soft, Non distended, Non tender. Normoactive bowel sounds. Assessment:   Stable for sedation administration.     Plan:     · Endoscopic procedure with sedation     Signed By: Joseph Wells MD     7/21/2020  7:25 AM

## 2020-07-21 NOTE — PROGRESS NOTES

## 2020-07-21 NOTE — DISCHARGE INSTRUCTIONS
Elliot aCstillo 918 Jaye Shone, M.D.  174 [facility], [address] [place] [address]  [phone]          [facility]  [id number]  1975    DISCOMFORT:  Sore throat- throat lozenges or warm salt water gargle  Redness at IV site- apply warm compress to area; if redness or soreness persist- contact your physician  Gaseous discomfort- walking, belching will help relieve any discomfort  You may not operate a vehicle for 12 hours  You may not engage in an occupation involving machinery or appliances for the  rest of today  You may not drink alcoholic beverages for at least 12 hours  Avoid making any critical decisions for at least 24 hours    DIET:   You may resume your normal diet, but some patients find that heavy or large  meals may lead to indigestion or vomiting. I suggest a light meal as first food  Intake. I recommend a whole food, plant-based diet for your overall health. ACTIVITY:  You may resume your normal daily activities. It is recommended that you spend the remainder of the day resting - avoid any strenuous activity. CALL CURTIS Barrera ANY SIGN OF:   Increasing pain, nausea, vomiting  Abdominal distension (swelling)  Significant bleeding (oral or rectal)  Fever   Pain in chest area  Shortness of breath    Additional Instructions:   Call Dr. 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 Repeat EGD at least 2 weeks out to see if I can dilate more. Smoking cessation. Continue PPI and Carafate. Full liquid diet the day before the procedure. My assistant will call to schedule. Learning About Coronavirus [phone])  Coronavirus [phone]): Overview  What is coronavirus (COVID-19)? The coronavirus disease (COVID-19) is caused by a virus. It is an illness that was first found in [facility], in December 2019. It has since spread worldwide.   The virus can cause fever, cough, and trouble breathing. In severe cases, it can cause pneumonia and make it hard to breathe without help. It can cause death. Coronaviruses are a large group of viruses. They cause the common cold. They also cause more serious illnesses like Middle East respiratory syndrome (MERS) and severe acute respiratory syndrome (SARS). COVID-19 is caused by a novel coronavirus. That means it's a new type that has not been seen in people before. This virus spreads person-to-person through droplets from coughing and sneezing. It can also spread when you are close to someone who is infected. And it can spread when you touch something that has the virus on it, such as a doorknob or a tabletop. What can you do to protect yourself from coronavirus (COVID-19)? The best way to protect yourself from getting sick is to:  · Avoid areas where there is an outbreak. · Avoid contact with people who may be infected. · Wash your hands often with soap or alcohol-based hand sanitizers. · Avoid crowds and try to stay at least 6 feet away from other people. · Wash your hands often, especially after you cough or sneeze. Use soap and water, and scrub for at least 20 seconds. If soap and water aren't available, use an alcohol-based hand . · Avoid touching your mouth, nose, and eyes. What can you do to avoid spreading the virus to others? To help avoid spreading the virus to others:  · Cover your mouth with a tissue when you cough or sneeze. Then throw the tissue in the trash. · Use a disinfectant to clean things that you touch often. · Stay home if you are sick or have been exposed to the virus. Don't go to school, work, or public areas. And don't use public transportation. · If you are sick:  ? Leave your home only if you need to get medical care. But call the doctor's office first so they know you're coming. And wear a face mask, if you have one.  ? If you have a face mask, wear it whenever you're around other people. It can help stop the spread of the virus when you cough or sneeze. ? Clean and disinfect your home every day. Use household  and disinfectant wipes or sprays. Take special care to clean things that you grab with your hands. These include doorknobs, remote controls, phones, and handles on your refrigerator and microwave. And don't forget countertops, tabletops, bathrooms, and computer keyboards. When to call for help  Call 911 anytime you think you may need emergency care. For example, call if:  · You have severe trouble breathing. (You can't talk at all.)  · You have constant chest pain or pressure. · You are severely dizzy or lightheaded. · You are confused or can't think clearly. · Your face and lips have a blue color. · You pass out (lose consciousness) or are very hard to wake up. Call your doctor now if you develop symptoms such as:  · Shortness of breath. · Fever. · Cough. If you need to get care, call ahead to the doctor's office for instructions before you go. Make sure you wear a face mask, if you have one, to prevent exposing other people to the virus. Where can you get the latest information? The following health organizations are tracking and studying this virus. Their websites contain the most up-to-date information. Albaromary Coyle also learn what to do if you think you may have been exposed to the virus. · U.S. Centers for Disease Control and Prevention (CDC): The CDC provides updated news about the disease and travel advice. The website also tells you how to prevent the spread of infection. www.cdc.gov  · World Health Organization [facility]): WHO offers information about the virus outbreaks. WHO also has travel advice. www.who.int  Current as of: April 1, 2020               Content Version: 12.4  © 4845-5206 [employer]orporated.    Care instructions adapted under license by your healthcare professional. If you have questions about a medical condition or this instruction, always ask your healthcare professional. [address] any warranty or liability for your use of this information.

## 2020-07-21 NOTE — PROCEDURES
Elliot HowellLauren Ville [id number] [place], M.D.  174 [facility], [address] [place] [address]  [phone]               Esophagogastroduodenoscopy (EGD) Procedure Note    NAME: Edna Rich  :  1975  MRN:  [id number]    Indications: anastomic stricture of gastrojejunostomy     : Leslye Braun MD    Referring Provider:  Mary Ruff MD    Surgical Assistant: none    Prosthetic devices, grafts, tissues, transplant, or devices implanted: none    Medicine:  MAC anesthesia      Procedure Details:  After informed consent was obtained with all risks and benefits of the procedure explained and preprocedure exam completed, the patient was placed in the left lateral decubitus position. Universal protocol for patient identification was performed and documented in the nursing notes. Throughout the procedure, the patient's blood pressure was monitored at least every five minutes; pulse, and oxygen saturations were monitored continuously. All vital signs were documented in the nursing notes. The endoscope was inserted into the mouth and advanced under direct vision to proximal jejunum. A careful inspection was made as the gastroscope was withdrawn, including a retroflexed view of the proximal stomach; findings and interventions are described below. Findings:   Esophagus:normal  Stomach: 8 cm pouch length, no hiatal hernia, gastrogastric fistula-not able to pass but saw the excluded stomach on the other side, anastomotic stricture ~ 9 mm-able to pass with endoscope s/p TTS balloon dilation from 10 mm up to 12 mm. Jejunum: normal    Interventions:    dilation of the stomach    Specimens:   * No specimens in log *     EBL: None          Complications:     No immediate complications        Impression:  -As above. Recommendations:  Repeat EGD to see if larger dilation can be performed  -PPI  -Smoking cessation    Signed by:  Leslye Braun MD         2020 7:48 AM

## 2020-07-21 NOTE — ROUTINE PROCESS
Petrona Essex  1975  [id number]    Situation:  Verbal report received from: Bryce King  Procedure: Procedure(s):  ESOPHAGOGASTRODUODENOSCOPY (EGD)   :-  ESOPHAGEAL DILATION    Background:    Preoperative diagnosis: ANASTOMOTIC STRICTURE OF GASTROJEJUNOSTOMY  Postoperative diagnosis: 1. Gastric bypass anatomy  2. Gastro-gastric fistula  3.  Gastro-jejunal Stricture      :  Dr. Sonya Feldman  Assistant(s): Endoscopy Technician-1: Nakul Bright  Endoscopy RN-1: Arturo De La Fuente RN    Specimens:   H. Pylori      Assessment:  Intra-procedure medications   Anesthesia gave intra-procedure sedation and medications, see anesthesia flow sheet yes    Intravenous fluids: NS@ KVO     Vital signs stable yes    Abdominal assessment: round and soft yes    Recommendation:  Discharge patient per MD order   Return to floor  Family or Friend  yes  Permission to share finding with family or friend yes

## 2020-11-05 ENCOUNTER — OFFICE VISIT (OUTPATIENT)
Dept: URGENT CARE | Age: 45
End: 2020-11-05
Payer: MEDICAID

## 2020-11-05 VITALS — RESPIRATION RATE: 12 BRPM | OXYGEN SATURATION: 95 % | TEMPERATURE: 98.8 F | HEART RATE: 91 BPM

## 2020-11-05 DIAGNOSIS — Z20.822 EXPOSURE TO COVID-19 VIRUS: Primary | ICD-10-CM

## 2020-11-05 PROCEDURE — 99213 OFFICE O/P EST LOW 20 MIN: CPT | Performed by: FAMILY MEDICINE

## 2020-11-05 NOTE — PROGRESS NOTES
This patient was seen in [facility] at [address] [place] [address] Urgent [facility] while in their vehicle due to COVID-19 pandemic with PPE and focused examination in order to decrease community viral transmission. The patient/guardian gave verbal consent to treat. Leandra Culp is a 54 y.o. female who presents for COVID-19 testing. Was possibly exposed to COVID-19 through daughter who was exposed at school 1 week ago. Denies cough, fever, SOB. The history is provided by the patient.         Past Medical History:   Diagnosis Date    Arthritis     Cholesteatoma     Chronic pain     arthritis    Depression     DM type 2 (diabetes mellitus, type 2) ([facility] [place] [facility] 2009 after gastric bypass surgery    GERD (gastroesophageal reflux disease)     mostly before gastric bypass/has again now    H/O gastric bypass     Ill-defined condition     anastomatic stricture    Morbid obesity (HCC)     Nausea & vomiting     with anesthesia    Nicotine vapor product user     Panic attacks     PUD (peptic ulcer disease)         Past Surgical History:   Procedure Laterality Date    HX CARPAL TUNNEL RELEASE      right    HX CHOLECYSTECTOMY      HX GASTRIC BYPASS      2016    HX HEENT      3 surgeries - hole in eardrum x 2 - 2 surgeries to repair this/third surgery to removed cholesteatoma    HX ORTHOPAEDIC      plantar fasciitis - right    HX OTHER SURGICAL      EGD - ulcer/dilatation    HX TONSILLECTOMY      HX VASCULAR ACCESS      pt states she has not had this    MULTIPLE DELIVERY       x3         Family History   Problem Relation Age of Onset    Depression Mother     Other Mother         osteopenia/polycythemia vera/fibormyalgia    Depression Sister     Cancer Other         non-lymphocytic leukemia    Heart Disease Paternal Uncle     Alzheimer Maternal Grandmother     Alzheimer Paternal Grandmother     Dementia Maternal Aunt         Social History     Socioeconomic History    Marital status: LEGALLY      Spouse name: Not on file    Number of children: Not on file    Years of education: Not on file    Highest education level: Not on file   Occupational History    Not on file   Social Needs    Financial resource strain: Not on file    Food insecurity     Worry: Not on file     Inability: Not on file    Transportation needs     Medical: Not on file     Non-medical: Not on file   Tobacco Use    Smoking status: Current Every Day Smoker     Packs/day: 1.00    Smokeless tobacco: Never Used   Substance and Sexual Activity    Alcohol use: Yes     Comment: very rare/none per pt on 10/7/2016/1-2 times per yr per pt on 7/10/2020    Drug use: No    Sexual activity: Yes     Partners: Male   Lifestyle    Physical activity     Days per week: Not on file     Minutes per session: Not on file    Stress: Not on file   Relationships    Social connections     Talks on phone: Not on file     Gets together: Not on file     Attends [employer] service: Not on file     Active member of club or organization: Not on file     Attends meetings of clubs or organizations: Not on file     Relationship status: Not on file    Intimate partner violence     Fear of current or ex partner: Not on file     Emotionally abused: Not on file     Physically abused: Not on file     Forced sexual activity: Not on file   Other Topics Concern    Not on file   Social History Narrative    Not on file                ALLERGIES: Allegra [fexofenadine]    Review of Systems   Constitutional: Negative for activity change, appetite change, chills and fever. HENT: Negative for congestion, rhinorrhea and sore throat. Respiratory: Negative for cough, shortness of breath and wheezing. Cardiovascular: Negative for chest pain. Gastrointestinal: Negative for abdominal pain, diarrhea, nausea and vomiting. Musculoskeletal: Negative for myalgias. Neurological: Negative for headaches.        Vitals: 11/05/20 1233   Pulse: 91   Resp: 12   Temp: 98.8 °F (37.1 °C)   SpO2: 95%       Physical Exam  Vitals signs and nursing note reviewed. Constitutional:       General: She is not in acute distress. Appearance: She is well-developed. She is not diaphoretic. Pulmonary:      Effort: Pulmonary effort is normal. No respiratory distress. Breath sounds: Normal breath sounds. No stridor. No wheezing, rhonchi or rales. Neurological:      Mental Status: She is alert. Psychiatric:         Behavior: Behavior normal.         Thought Content: Thought content normal.         Judgment: Judgment normal.         MDM    ICD-10-CM ICD-9-CM   1. Exposure to COVID-19 virus  Z20.828 V01.79       Orders Placed This Encounter    NOVEL CORONAVIRUS (COVID-19)     Scheduling Instructions:      1) Due to current limited availability of the COVID-19 PCR test, tests will be prioritized and may not be completed.              2) Order only if the test result will change clinical management or necessary for a return to mission-critical employment decision.              3) Print and instruct patient to adhere to CDC home isolation program. (Link Above)              4) Set up or refer patient for a monitoring program.              5) Have patient sign up for and leverage [employer] (if not previously done). Order Specific Question:   Is this test for diagnosis or screening? Answer:   Screening     Order Specific Question:   Symptomatic for COVID-19 as defined by CDC? Answer:   No     Order Specific Question:   Hospitalized for COVID-19? Answer:   No     Order Specific Question:   Admitted to ICU for COVID-19? Answer:   No     Order Specific Question:   Employed in healthcare setting? Answer:   No     Order Specific Question:   Resident in a congregate (group) care setting? Answer:   No     Order Specific Question:   Pregnant? Answer:   No     Order Specific Question:   Previously tested for COVID-19? Answer:   Yes        Quarantine, await results    If signs and symptoms become worse the pt is to go to the ER.          Procedures

## 2020-11-08 LAB — SARS-COV-2, NAA: NOT DETECTED

## 2021-04-14 ENCOUNTER — APPOINTMENT (OUTPATIENT)
Dept: GENERAL RADIOLOGY | Age: 46
End: 2021-04-14
Attending: PHYSICIAN ASSISTANT
Payer: MEDICAID

## 2021-04-14 ENCOUNTER — HOSPITAL ENCOUNTER (EMERGENCY)
Age: 46
Discharge: HOME | End: 2021-04-14
Attending: EMERGENCY MEDICINE
Payer: MEDICAID

## 2021-04-14 VITALS
HEART RATE: 96 BPM | BODY MASS INDEX: 43.82 KG/M2 | DIASTOLIC BLOOD PRESSURE: 82 MMHG | RESPIRATION RATE: 16 BRPM | WEIGHT: 238.1 LBS | OXYGEN SATURATION: 100 % | HEIGHT: 62 IN | TEMPERATURE: 97.9 F | SYSTOLIC BLOOD PRESSURE: 141 MMHG

## 2021-04-14 DIAGNOSIS — J20.9 ACUTE BRONCHITIS, UNSPECIFIED ORGANISM: ICD-10-CM

## 2021-04-14 DIAGNOSIS — J06.9 UPPER RESPIRATORY TRACT INFECTION, UNSPECIFIED TYPE: Primary | ICD-10-CM

## 2021-04-14 DIAGNOSIS — Z20.822 PERSON UNDER INVESTIGATION FOR COVID-19: ICD-10-CM

## 2021-04-14 LAB — SARS-COV-2, COV2: NORMAL

## 2021-04-14 PROCEDURE — 99283 EMERGENCY DEPT VISIT LOW MDM: CPT

## 2021-04-14 PROCEDURE — U0005 INFEC AGEN DETEC AMPLI PROBE: HCPCS

## 2021-04-14 PROCEDURE — 71045 X-RAY EXAM CHEST 1 VIEW: CPT

## 2021-04-14 NOTE — ED PROVIDER NOTES
EMERGENCY DEPARTMENT HISTORY AND PHYSICAL EXAM      Date: 4/14/2021  Patient Name: Paul Isaac    History of Presenting Illness     Chief Complaint   Patient presents with    Cough     hacking cough productive of yellow phlegm. PCP placed her on azithromycin, but she is done with it and her sx are worsening.  x 1.5 weeks    Headache     x 3 days, global, sensitive to light and sound       History Provided By: Patient    HPI: Paul Isaac, 54 y.o. smoking 1 ppd female with PMHx DM2, depression, chronic pain 2/2 arthritis, s/p gastric bypass presents BIB self to the ED with cc of nasal congestion, cough with yellow sputum, post nasal drip, headache, fatigue, and generalized achiness X 1.5 weeks. Pt's PCP prescribed a z-pack which pt has completed without improvement. Pt was seen via televisit. Admits temp 100.8F last night. Admits CP with coughing episodes. Denies acute vision change, otalgia, numbness, weakness, N/V/D, abd pain, urinary sxs. Patient's daughter is being evaluated for URI symptoms in the ED as well. Pt has received 1 shot of moderna vaccine. IUD for contraception. There are no other complaints, changes, or physical findings at this time. PCP: Julia Gibbs MD    No current facility-administered medications on file prior to encounter. Current Outpatient Medications on File Prior to Encounter   Medication Sig Dispense Refill    sucralfate (Carafate) 1 gram tablet Take 1 g by mouth four (4) times daily.  ALPRAZolam (XANAX) 1 mg tablet Take 1 mg by mouth two (2) times daily as needed.  diphenhydrAMINE (ALLERGY RELIEF,DIPHENHYDRAMIN,) 25 mg capsule Take 25 mg by mouth daily.  venlafaxine-SR (EFFEXOR XR) 75 mg capsule Take 75 mg by mouth daily.  INTRAUTERINE DEVICE, IUD, IU by IntraUTERine route.  PANTOPRAZOLE SODIUM (PROTONIX PO) Take 40 mg by mouth daily.          Past History     Past Medical History:  Past Medical History:   Diagnosis Date    Arthritis     Cholesteatoma     Chronic pain     arthritis    Depression     DM type 2 (diabetes mellitus, type 2) ([facility] [address] 2009 2016 after gastric bypass surgery    GERD (gastroesophageal reflux disease)     mostly before gastric bypass/has again now    H/O gastric bypass     Ill-defined condition     anastomatic stricture    Morbid obesity (HCC)     Nausea & vomiting     with anesthesia    Nicotine vapor product user     Panic attacks     PUD (peptic ulcer disease)        Past Surgical History:  Past Surgical History:   Procedure Laterality Date    HX CARPAL TUNNEL RELEASE      right    HX CHOLECYSTECTOMY      HX GASTRIC BYPASS      2016    HX HEENT      3 surgeries - hole in eardrum x 2 - 2 surgeries to repair this/third surgery to removed cholesteatoma    HX ORTHOPAEDIC      plantar fasciitis - right    HX OTHER SURGICAL      EGD - ulcer/dilatation    HX TONSILLECTOMY      HX VASCULAR ACCESS      pt states she has not had this    MULTIPLE DELIVERY       x3       Family History:  Family History   Problem Relation Age of Onset    Depression Mother     Other Mother         osteopenia/polycythemia vera/fibormyalgia    Depression Sister     Cancer Other         non-lymphocytic leukemia    Heart Disease Paternal Uncle     Alzheimer Maternal Grandmother     Alzheimer Paternal Grandmother     Dementia Maternal Aunt        Social History:  Social History     Tobacco Use    Smoking status: Current Every Day Smoker     Packs/day: 1.00    Smokeless tobacco: Never Used   Substance Use Topics    Alcohol use: Yes     Comment: very rare/none per pt on 10/7/2016/1-2 times per yr per pt on 7/10/2020    Drug use: No       Allergies: Allergies   Allergen Reactions    Allegra [Fexofenadine] Hives         Review of Systems   Review of Systems   Constitutional: Positive for chills, fatigue and fever. HENT: Positive for congestion.  Negative for ear pain, sore throat and trouble swallowing. Eyes: Negative for redness. Respiratory: Positive for cough. Negative for stridor. Cardiovascular: Positive for chest pain (With coughing episodes). Gastrointestinal: Negative for nausea and vomiting. Endocrine: Negative for polydipsia. Genitourinary: Negative for dysuria. Musculoskeletal: Negative for gait problem. Skin: Negative for rash. Allergic/Immunologic: Negative for immunocompromised state. Neurological: Positive for headaches. Hematological: Does not bruise/bleed easily. Psychiatric/Behavioral: Negative for agitation. All other systems reviewed and are negative. Physical Exam   Physical Exam  Vitals signs and nursing note reviewed. Constitutional:       General: She is not in acute distress. Appearance: Normal appearance. She is well-developed. She is not toxic-appearing. HENT:      Head: Normocephalic and atraumatic. Comments: Right-sided cholesteatoma     Right Ear: Hearing normal. Tympanic membrane is not erythematous or bulging. Left Ear: Hearing normal. Tympanic membrane is not erythematous or bulging. Nose: Congestion present. Mouth/Throat:      Mouth: Mucous membranes are moist.      Pharynx: Uvula midline. No oropharyngeal exudate or posterior oropharyngeal erythema. Eyes:      General: Lids are normal.      Extraocular Movements: Extraocular movements intact. Conjunctiva/sclera: Conjunctivae normal.      Pupils: Pupils are equal, round, and reactive to light. Neck:      Musculoskeletal: Normal range of motion and neck supple. No neck rigidity. Cardiovascular:      Rate and Rhythm: Normal rate and regular rhythm. Pulmonary:      Effort: Pulmonary effort is normal.      Breath sounds: Normal breath sounds. No wheezing or rhonchi. Abdominal:      Palpations: Abdomen is soft. Tenderness: There is no abdominal tenderness. There is no guarding. Musculoskeletal: Normal range of motion. Lymphadenopathy:      Cervical: No cervical adenopathy. Skin:     General: Skin is warm and dry. Neurological:      General: No focal deficit present. Mental Status: She is alert and oriented to person, place, and time. Psychiatric:         Mood and Affect: Mood normal.         Behavior: Behavior normal. Behavior is cooperative. Diagnostic Study Results     Labs -     Recent Results (from the past 12 hour(s))   SARS-COV-2    Collection Time: 04/14/21  9:59 AM   Result Value Ref Range    SARS-CoV-2 Please find results under separate order         Radiologic Studies -   XR CHEST PORT   Final Result   Clear lungs. CT Results  (Last 48 hours)    None        CXR Results  (Last 48 hours)               04/14/21 0931  XR CHEST PORT Final result    Impression:  Clear lungs. Narrative:  PORTABLE CHEST RADIOGRAPH/S: 4/14/2021 9:31 AM       INDICATION: Cough for 10 days, congestion. COMPARISON: 4/27/2019, 9/16/2012. TECHNIQUE: Portable frontal upright radiograph/s of the chest.       FINDINGS:    The lungs are clear. The central airways are patent. No pneumothorax or pleural   effusion. The radiograph is slightly underpenetrated secondary to patient body   habitus and portable technique. Medical Decision Making   I am the first provider for this patient. I reviewed the vital signs, available nursing notes, past medical history, past surgical history, family history and social history. Vital Signs-Reviewed the patient's vital signs. Patient Vitals for the past 12 hrs:   Temp Pulse Resp BP SpO2   04/14/21 0851 97.9 °F (36.6 °C) 96 16 (!) 141/82 100 %       Records Reviewed: Nursing Notes    Provider Notes (Medical Decision Making):   69-year-old smoking female presents with viral URI symptoms x1.5 weeks. No improvement Z-Bruce prescribed by PCP. In the ED she is well-appearing and in no acute distress. Vital signs are stable. Afebrile.   She does have some nasal congestion, otherwise exam is largely unremarkable. X-ray obtained for patient reassurance, results normal.  PCR Covid test sent, results pending. Advised to quarantine per CDC guidelines. Advised on symptomatic management, rest, oral hydration. Patient voices understanding and agreement with this plan. ED Course:   Initial assessment performed. The patients presenting problems have been discussed, and they are in agreement with the care plan formulated and outlined with them. I have encouraged them to ask questions as they arise throughout their visit. Critical Care Time: None    Disposition:  D/c    PLAN:  1. Discharge Medication List as of 4/14/2021 10:02 AM        2. Follow-up Information     Follow up With Specialties Details Why Contact Info    Corry Villela MD Family Medicine Call  For follow up [address] [facility]  [phone]      hospitals EMERGENCY DEPT Emergency Medicine  As needed, If symptoms worsen [address] [place] [address]  [phone]        Return to ED if worse     Diagnosis     Clinical Impression:   1. Upper respiratory tract infection, unspecified type    2. Acute bronchitis, unspecified organism    3. Person under investigation for COVID-19      3:48 PM  I was personally available for consultation in the emergency department. I have reviewed the chart and agree with the documentation recorded by the [facility], including the assessment, treatment plan, and disposition. Shelton Torres MD        Please note that this dictation was completed with [employer] ([email or web address]), the computer voice recognition software. Quite often unanticipated grammatical, syntax, homophones, and other interpretive errors are inadvertently transcribed by the computer software. Please disregards these errors. Please excuse any errors that have escaped final proofreading.

## 2021-04-14 NOTE — Clinical Note
[address] 
[facility] EMERGENCY DEPT 
[address] [facility] [address] [place] [address] 
[phone] Work/School Note Date: 4/14/2021 To Whom It May concern: 
 
Tarun Dejesus was evaulated by the following provider(s): 
Attending Provider: Marvin Peraza MD 
Physician Assistant: Angelique Iraheta, [address] [place] [address] virus is suspected. Per the CDC guidelines we recommend home isolation until the following conditions are all met: 1. At least 10 days have passed since symptoms first appeared and 2. At least 24 hours have passed since last fever without the use of fever-reducing medications and 
3. Symptoms (e.g., cough, shortness of breath) have improved Sincerely, Dung Aldridge

## 2021-04-14 NOTE — DISCHARGE INSTRUCTIONS

## 2021-04-15 ENCOUNTER — PATIENT OUTREACH (OUTPATIENT)
Dept: CASE MANAGEMENT | Age: 46
End: 2021-04-15

## 2021-04-15 LAB
SARS-COV-2, XPLCVT: DETECTED
SOURCE, COVRS: ABNORMAL

## 2021-04-15 NOTE — ACP (ADVANCE CARE PLANNING)
Advance Care Planning:   Does patient have an Advance Directive:  not on file; education provided. Pt stated she has two adult children of which one has Asperger's and the other she would not want to make healthcare decisions for her. Pt verbally named her parents as SDM. Advised pt to complete AMD, which can be obtained from My Chart. Understanding and agreement was verbalized.       Primary Decision Maker: Christy Caal - Father - [phone]    Secondary Decision Maker: Alphonso Genao - Parent - [phone]

## 2021-04-15 NOTE — PROGRESS NOTES
Patient contacted regarding COVID-19 PUI. [facility] ED 21 dx-URTI, acute bronchitis, PUI for covid-19 (cough, HA)    Discussed COVID-19 related testing which was pending at this time. Test results were pending. Patient informed of results, if available? Pending     Pt stated she is still coughing, has sinus pressure and a headache. She will call PCP. She has a new second job at at [employer] and they get covid tested on a regular basis. She has received one dose of the covid Moderna vaccine. Ambulatory Care Manager contacted the patient by telephone to perform post discharge assessment. Call within 2 business days of discharge: Yes Verified name and  with patient as identifiers. Provided introduction to self, and explanation of the CTN/ACM role, and reason for call due to risk factors for infection and/or exposure to COVID-19. Symptoms reviewed with patient who verbalized the following symptoms: cough, no new symptoms, no worsening symptoms and sinus pressure, HA      Due to no new or worsening symptoms encounter was not routed to provider for escalation. Discussed follow-up appointments. If no appointment was previously scheduled, appointment scheduling offered:  yes   [facility] follow up appointment(s): No future appointments. Non-[facility] follow up appointment(s): Pt will call PCP     Advance Care Planning:   Does patient have an Advance Directive:  not on file; education provided. Patient has following risk factors of: diabetes and smoker, obesity. ACM reviewed discharge instructions, medical action plan and red flags such as increased shortness of breath, increasing fever and signs of decompensation with parent who verbalized understanding. Discussed exposure protocols and quarantine with CDC Guidelines What to do if you are sick with coronavirus disease .  Patient was given an opportunity for questions and concerns.  The patient agrees to contact the [facility] exposure line [phone], local health department n/a and PCP office for questions related to their healthcare. ACM provided contact information for future needs. Reviewed and educated patient on any new and changed medications related to discharge diagnosis     Was patient discharged with a pulse oximeter? no Discussed and confirmed pulse oximeter discharge instructions and when to notify provider or seek emergency care. Plan for follow up in 7-14 days based on severity of symptoms and risk factors.

## 2021-04-15 NOTE — PROGRESS NOTES
The patient was called for notification of a POSITIVE test result for COVID-19. The following information was given to the patient:    The COVID-19 test result was positive  Mild and stable symptoms are managed at home    Treatment of coronavirus does not require an antibiotic  Remain isolated for 10 days since symptoms first appeared AND at least 3 days have passed after recovery    Recovery is defined as resolution of fever without the use of fever-reducing medications with progressive improvement or resolution of other symptoms    Wash hands often with soap and water for at least 20 seconds or alternatively use hand  with at least 60% alcohol content  Cover coughs and sneezes  Wear a mask when around others if possible  Clean all high-touch surfaces every day, such as doorknobs and cellphones  Continually monitor symptoms. Contact your medical provider if symptoms are worsening, such as difficulty breathing.

## 2021-05-03 ENCOUNTER — PATIENT OUTREACH (OUTPATIENT)
Dept: CASE MANAGEMENT | Age: 46
End: 2021-05-03

## 2021-05-03 NOTE — PROGRESS NOTES
Patient resolved from [address] Transitions episode on 5/3/21- f/u-[facility] ED 4/14/21 dx-URTI, acute bronchitis, PUI for covid-19 (cough, HA). Discussed COVID-19 related testing which was available at this time. Test results were positive. Patient informed of results, if available? yes     Patient/family has been provided the following resources and education related to COVID-19:                         Signs, symptoms and red flags related to COVID-19            CDC exposure and quarantine guidelines            [facility] exposure contact - [phone]            Contact for their local Department of Health                 Patient currently reports that the following symptoms have improved:  cough. Pt stated she is back at work. No further outreach scheduled with this CTN/ACM/LPN/HC/ MA. Episode of Care resolved. Patient has this CTN/ACM/LPN/HC/MA contact information if future needs arise.

## 2021-06-11 RX ORDER — INSULIN GLARGINE 100 [IU]/ML
40 INJECTION, SOLUTION SUBCUTANEOUS
COMMUNITY
Start: 2021-01-15

## 2021-06-11 RX ORDER — BISMUTH SUBSALICYLATE 262 MG
1 TABLET,CHEWABLE ORAL DAILY
COMMUNITY

## 2021-06-11 NOTE — PERIOP NOTES
[facility]  Ambulatory Surgery Unit  Pre-operative Instructions    Surgery/Procedure Date  6/21            Tentative Arrival Time tbd      1. On the day of your surgery/procedure, please report to the Ambulatory Surgery Unit Registration Desk and sign in at your designated time. The Ambulatory Surgery Unit is located in [facility] on the [facility] side of the Hospitals in [place] across from the [address] [place] [address]. Please have all of your health insurance cards and a photo ID. 2. You must have someone with you to drive you home, as you should not drive a car for 24 hours following anesthesia. Please make arrangements for a responsible adult friend or family member to stay with you for at least the first 24 hours after your surgery. 3. Do not have anything to eat or drink (including water, gum, mints, coffee, juice) after 11:59 PM  6/20. This may not apply to medications prescribed by your physician. (Please note below the special instructions with medications to take the morning of surgery, if applicable.)    4. We recommend you do not drink any alcoholic beverages for 24 hours before and after your surgery. 5. Contact your surgeons office for instructions on the following medications: non-steroidal anti-inflammatory drugs (i.e. Advil, Aleve), vitamins, and supplements. (Some surgeons will want you to stop these medications prior to surgery and others may allow you to take them)   **If you are currently taking Plavix, Coumadin, Aspirin and/or other blood-thinning agents, contact your surgeon for instructions. ** Your surgeon will partner with the physician prescribing these medications to determine if it is safe to stop or if you need to continue taking. Please do not stop taking these medications without instructions from your surgeon.     6. In an effort to help prevent surgical site infection, we ask that you shower with an anti-bacterial soap (i.e. Dial/Safeguard, or the soap provided to you at your preadmission testing appointment) for 3 days prior to and on the morning of surgery, using a fresh towel after each shower. (Please begin this process with fresh bed linens.) Do not apply any lotions, powders, or deodorants after the shower on the day of your procedure. If applicable, please do not shave the operative site for 48 hours prior to surgery. 7. Wear comfortable clothes. Wear glasses instead of contacts. Do not bring any jewelry or money (other than copays or fees as instructed). Do not wear make-up, particularly mascara, the morning of your surgery. Do not wear nail polish, particularly if you are having foot /hand surgery. Wear your hair loose or down, no ponytails, buns, vera pins or clips. All body piercings must be removed. 8. You should understand that if you do not follow these instructions your surgery may be cancelled. If your physical condition changes (i.e. fever, cold or flu) please contact your surgeon as soon as possible. 9. It is important that you be on time. If a situation occurs where you may be late, or if you have any questions or problems, please call [phone].    10. Your surgery time may be subject to change. You will receive a phone call the day prior to surgery to confirm your arrival time. Special Instructions: Take all medications and inhalers, as prescribed, on the morning of surgery with a sip of water EXCEPT: none (bring vaccine card dos)      Insulin Dependent Diabetic patients: Take your diabetic medications as prescribed the day before surgery. Hold all diabetic medications the day of surgery. If you are scheduled to arrive for surgery after 8:00 AM, and your AM blood sugar is >200, please call Ambulatory Surgery. I understand a pre-operative phone call will be made to verify my surgery time. In the event that I am not available, I give permission for a message to be left on my answering service and/or with another person? yes    Reviewed by phone with pt, verbalized understanding.     ___________________      ___________________      ________________  (Signature of Patient)          (Witness)                   (Date and Time)

## 2021-06-18 ENCOUNTER — ANESTHESIA EVENT (OUTPATIENT)
Dept: SURGERY | Age: 46
End: 2021-06-18
Payer: MEDICAID

## 2021-06-21 ENCOUNTER — ANESTHESIA (OUTPATIENT)
Dept: SURGERY | Age: 46
End: 2021-06-21
Payer: MEDICAID

## 2021-06-21 ENCOUNTER — HOSPITAL ENCOUNTER (OUTPATIENT)
Age: 46
Setting detail: OUTPATIENT SURGERY
Discharge: HOME | End: 2021-06-21
Attending: ORTHOPAEDIC SURGERY | Admitting: ORTHOPAEDIC SURGERY
Payer: MEDICAID

## 2021-06-21 VITALS
DIASTOLIC BLOOD PRESSURE: 76 MMHG | WEIGHT: 239 LBS | RESPIRATION RATE: 23 BRPM | HEART RATE: 97 BPM | TEMPERATURE: 98 F | OXYGEN SATURATION: 96 % | HEIGHT: 63 IN | SYSTOLIC BLOOD PRESSURE: 122 MMHG | BODY MASS INDEX: 42.35 KG/M2

## 2021-06-21 DIAGNOSIS — G89.18 POSTOPERATIVE PAIN: Primary | ICD-10-CM

## 2021-06-21 LAB
GLUCOSE BLD STRIP.AUTO-MCNC: 144 MG/DL (ref 65–117)
GLUCOSE BLD STRIP.AUTO-MCNC: 177 MG/DL (ref 65–117)
HCG UR QL: NEGATIVE
SERVICE CMNT-IMP: ABNORMAL
SERVICE CMNT-IMP: ABNORMAL

## 2021-06-21 PROCEDURE — 77030014006 HC SPNG HEMSTAT J&J -A: Performed by: ORTHOPAEDIC SURGERY

## 2021-06-21 PROCEDURE — A4565 SLINGS: HCPCS | Performed by: ORTHOPAEDIC SURGERY

## 2021-06-21 PROCEDURE — 77030002922 HC SUT FBRWRE ARTH -B: Performed by: ORTHOPAEDIC SURGERY

## 2021-06-21 PROCEDURE — 77030008841 HC WRE K MCRA -A: Performed by: ORTHOPAEDIC SURGERY

## 2021-06-21 PROCEDURE — 74011250636 HC RX REV CODE- 250/636: Performed by: ANESTHESIOLOGY

## 2021-06-21 PROCEDURE — 81025 URINE PREGNANCY TEST: CPT

## 2021-06-21 PROCEDURE — C1713 ANCHOR/SCREW BN/BN,TIS/BN: HCPCS | Performed by: ORTHOPAEDIC SURGERY

## 2021-06-21 PROCEDURE — 2709999900 HC NON-CHARGEABLE SUPPLY: Performed by: ORTHOPAEDIC SURGERY

## 2021-06-21 PROCEDURE — 77030040356 HC CORD BPLR FRCP COVD -A: Performed by: ORTHOPAEDIC SURGERY

## 2021-06-21 PROCEDURE — 77030021352 HC CBL LD SYS DISP COVD -B: Performed by: ORTHOPAEDIC SURGERY

## 2021-06-21 PROCEDURE — 74011000250 HC RX REV CODE- 250: Performed by: NURSE ANESTHETIST, CERTIFIED REGISTERED

## 2021-06-21 PROCEDURE — 77030002916 HC SUT ETHLN J&J -A: Performed by: ORTHOPAEDIC SURGERY

## 2021-06-21 PROCEDURE — 76060000064 HC AMB SURG ANES 2 TO 2.5 HR: Performed by: ORTHOPAEDIC SURGERY

## 2021-06-21 PROCEDURE — 76210000034 HC AMBSU PH I REC 0.5 TO 1 HR: Performed by: ORTHOPAEDIC SURGERY

## 2021-06-21 PROCEDURE — 74011000250 HC RX REV CODE- 250: Performed by: ORTHOPAEDIC SURGERY

## 2021-06-21 PROCEDURE — 74011250636 HC RX REV CODE- 250/636: Performed by: NURSE ANESTHETIST, CERTIFIED REGISTERED

## 2021-06-21 PROCEDURE — 77030002912 HC SUT ETHBND J&J -A: Performed by: ORTHOPAEDIC SURGERY

## 2021-06-21 PROCEDURE — 77030006689 HC BLD OPHTH BVR BD -A: Performed by: ORTHOPAEDIC SURGERY

## 2021-06-21 PROCEDURE — 76210000046 HC AMBSU PH II REC FIRST 0.5 HR: Performed by: ORTHOPAEDIC SURGERY

## 2021-06-21 PROCEDURE — 77030031139 HC SUT VCRL2 J&J -A: Performed by: ORTHOPAEDIC SURGERY

## 2021-06-21 PROCEDURE — 74011250636 HC RX REV CODE- 250/636: Performed by: ORTHOPAEDIC SURGERY

## 2021-06-21 PROCEDURE — 77030000032 HC CUF TRNQT ZIMM -B: Performed by: ORTHOPAEDIC SURGERY

## 2021-06-21 PROCEDURE — 77030013837 HC NERV BLK KT BBMI -B: Performed by: ANESTHESIOLOGY

## 2021-06-21 PROCEDURE — 76030000004 HC AMB SURG OR TIME 2 TO 2.5: Performed by: ORTHOPAEDIC SURGERY

## 2021-06-21 PROCEDURE — 77030002933 HC SUT MCRYL J&J -A: Performed by: ORTHOPAEDIC SURGERY

## 2021-06-21 PROCEDURE — 82962 GLUCOSE BLOOD TEST: CPT

## 2021-06-21 DEVICE — KIT IMPL DIA1.1MM CMC S STL REP MINI TIGHTROPE: Type: IMPLANTABLE DEVICE | Site: THUMB | Status: FUNCTIONAL

## 2021-06-21 RX ORDER — SODIUM CHLORIDE 0.9 % (FLUSH) 0.9 %
5-40 SYRINGE (ML) INJECTION AS NEEDED
Status: DISCONTINUED | OUTPATIENT
Start: 2021-06-21 | End: 2021-06-21 | Stop reason: HOSPADM

## 2021-06-21 RX ORDER — SODIUM CHLORIDE, SODIUM LACTATE, POTASSIUM CHLORIDE, CALCIUM CHLORIDE 600; 310; 30; 20 MG/100ML; MG/100ML; MG/100ML; MG/100ML
25 INJECTION, SOLUTION INTRAVENOUS CONTINUOUS
Status: DISCONTINUED | OUTPATIENT
Start: 2021-06-21 | End: 2021-06-21 | Stop reason: HOSPADM

## 2021-06-21 RX ORDER — FENTANYL CITRATE 50 UG/ML
25 INJECTION, SOLUTION INTRAMUSCULAR; INTRAVENOUS
Status: DISCONTINUED | OUTPATIENT
Start: 2021-06-21 | End: 2021-06-21 | Stop reason: HOSPADM

## 2021-06-21 RX ORDER — MORPHINE SULFATE 10 MG/ML
2 INJECTION, SOLUTION INTRAMUSCULAR; INTRAVENOUS
Status: DISCONTINUED | OUTPATIENT
Start: 2021-06-21 | End: 2021-06-21 | Stop reason: HOSPADM

## 2021-06-21 RX ORDER — HYDROMORPHONE HYDROCHLORIDE 1 MG/ML
.2-.5 INJECTION, SOLUTION INTRAMUSCULAR; INTRAVENOUS; SUBCUTANEOUS ONCE
Status: DISCONTINUED | OUTPATIENT
Start: 2021-06-21 | End: 2021-06-21 | Stop reason: HOSPADM

## 2021-06-21 RX ORDER — SODIUM CHLORIDE 0.9 % (FLUSH) 0.9 %
5-40 SYRINGE (ML) INJECTION EVERY 8 HOURS
Status: DISCONTINUED | OUTPATIENT
Start: 2021-06-21 | End: 2021-06-21 | Stop reason: HOSPADM

## 2021-06-21 RX ORDER — PROPOFOL 10 MG/ML
INJECTION, EMULSION INTRAVENOUS AS NEEDED
Status: DISCONTINUED | OUTPATIENT
Start: 2021-06-21 | End: 2021-06-21 | Stop reason: HOSPADM

## 2021-06-21 RX ORDER — DIPHENHYDRAMINE HYDROCHLORIDE 50 MG/ML
12.5 INJECTION, SOLUTION INTRAMUSCULAR; INTRAVENOUS AS NEEDED
Status: DISCONTINUED | OUTPATIENT
Start: 2021-06-21 | End: 2021-06-21 | Stop reason: HOSPADM

## 2021-06-21 RX ORDER — ONDANSETRON 2 MG/ML
INJECTION INTRAMUSCULAR; INTRAVENOUS AS NEEDED
Status: DISCONTINUED | OUTPATIENT
Start: 2021-06-21 | End: 2021-06-21 | Stop reason: HOSPADM

## 2021-06-21 RX ORDER — ROPIVACAINE HYDROCHLORIDE 5 MG/ML
INJECTION, SOLUTION EPIDURAL; INFILTRATION; PERINEURAL
Status: DISCONTINUED
Start: 2021-06-21 | End: 2021-06-21 | Stop reason: HOSPADM

## 2021-06-21 RX ORDER — DEXAMETHASONE SODIUM PHOSPHATE 4 MG/ML
INJECTION, SOLUTION INTRA-ARTICULAR; INTRALESIONAL; INTRAMUSCULAR; INTRAVENOUS; SOFT TISSUE AS NEEDED
Status: DISCONTINUED | OUTPATIENT
Start: 2021-06-21 | End: 2021-06-21 | Stop reason: HOSPADM

## 2021-06-21 RX ORDER — OXYCODONE AND ACETAMINOPHEN 5; 325 MG/1; MG/1
1 TABLET ORAL
Status: DISCONTINUED | OUTPATIENT
Start: 2021-06-21 | End: 2021-06-21 | Stop reason: HOSPADM

## 2021-06-21 RX ORDER — ROPIVACAINE HYDROCHLORIDE 5 MG/ML
INJECTION, SOLUTION EPIDURAL; INFILTRATION; PERINEURAL AS NEEDED
Status: DISCONTINUED | OUTPATIENT
Start: 2021-06-21 | End: 2021-06-21 | Stop reason: HOSPADM

## 2021-06-21 RX ORDER — ROPIVACAINE HYDROCHLORIDE 5 MG/ML
INJECTION, SOLUTION EPIDURAL; INFILTRATION; PERINEURAL
Status: COMPLETED
Start: 2021-06-21 | End: 2021-06-21

## 2021-06-21 RX ORDER — PHENYLEPHRINE HCL IN 0.9% NACL 0.4MG/10ML
SYRINGE (ML) INTRAVENOUS AS NEEDED
Status: DISCONTINUED | OUTPATIENT
Start: 2021-06-21 | End: 2021-06-21 | Stop reason: HOSPADM

## 2021-06-21 RX ORDER — LIDOCAINE HYDROCHLORIDE 10 MG/ML
0.1 INJECTION, SOLUTION EPIDURAL; INFILTRATION; INTRACAUDAL; PERINEURAL AS NEEDED
Status: DISCONTINUED | OUTPATIENT
Start: 2021-06-21 | End: 2021-06-21 | Stop reason: HOSPADM

## 2021-06-21 RX ORDER — PROPOFOL 10 MG/ML
INJECTION, EMULSION INTRAVENOUS
Status: COMPLETED
Start: 2021-06-21 | End: 2021-06-21

## 2021-06-21 RX ORDER — OXYCODONE HYDROCHLORIDE 5 MG/1
5 TABLET ORAL
Qty: 40 TABLET | Refills: 0 | Status: SHIPPED | OUTPATIENT
Start: 2021-06-21 | End: 2021-07-05

## 2021-06-21 RX ORDER — DEXMEDETOMIDINE HYDROCHLORIDE 100 UG/ML
INJECTION, SOLUTION INTRAVENOUS AS NEEDED
Status: DISCONTINUED | OUTPATIENT
Start: 2021-06-21 | End: 2021-06-21 | Stop reason: HOSPADM

## 2021-06-21 RX ORDER — PROPOFOL 10 MG/ML
INJECTION, EMULSION INTRAVENOUS
Status: DISCONTINUED | OUTPATIENT
Start: 2021-06-21 | End: 2021-06-21 | Stop reason: HOSPADM

## 2021-06-21 RX ADMIN — DEXMEDETOMIDINE HYDROCHLORIDE 4 MCG: 100 INJECTION, SOLUTION, CONCENTRATE INTRAVENOUS at 11:57

## 2021-06-21 RX ADMIN — PROPOFOL 25 MG: 10 INJECTION, EMULSION INTRAVENOUS at 11:57

## 2021-06-21 RX ADMIN — ONDANSETRON HYDROCHLORIDE 4 MG: 2 INJECTION, SOLUTION INTRAMUSCULAR; INTRAVENOUS at 12:13

## 2021-06-21 RX ADMIN — PROPOFOL 20 MG: 10 INJECTION, EMULSION INTRAVENOUS at 13:22

## 2021-06-21 RX ADMIN — PROPOFOL 25 MG: 10 INJECTION, EMULSION INTRAVENOUS at 12:00

## 2021-06-21 RX ADMIN — Medication 40 MCG: at 13:25

## 2021-06-21 RX ADMIN — PROPOFOL 20 MG: 10 INJECTION, EMULSION INTRAVENOUS at 13:11

## 2021-06-21 RX ADMIN — Medication 120 MCG: at 12:20

## 2021-06-21 RX ADMIN — DEXAMETHASONE SODIUM PHOSPHATE 4 MG: 4 INJECTION, SOLUTION INTRAMUSCULAR; INTRAVENOUS at 12:13

## 2021-06-21 RX ADMIN — Medication 40 MCG: at 12:32

## 2021-06-21 RX ADMIN — PROPOFOL 25 MG: 10 INJECTION, EMULSION INTRAVENOUS at 12:08

## 2021-06-21 RX ADMIN — WATER 2 G: 1 INJECTION INTRAMUSCULAR; INTRAVENOUS; SUBCUTANEOUS at 11:59

## 2021-06-21 RX ADMIN — DEXMEDETOMIDINE HYDROCHLORIDE 4 MCG: 100 INJECTION, SOLUTION, CONCENTRATE INTRAVENOUS at 12:00

## 2021-06-21 RX ADMIN — ROPIVACAINE HYDROCHLORIDE 40 ML: 5 INJECTION, SOLUTION EPIDURAL; INFILTRATION; PERINEURAL at 11:40

## 2021-06-21 RX ADMIN — DEXMEDETOMIDINE HYDROCHLORIDE 2 MCG: 100 INJECTION, SOLUTION, CONCENTRATE INTRAVENOUS at 13:22

## 2021-06-21 RX ADMIN — PROPOFOL 20 MG: 10 INJECTION, EMULSION INTRAVENOUS at 13:14

## 2021-06-21 RX ADMIN — DEXMEDETOMIDINE HYDROCHLORIDE 2 MCG: 100 INJECTION, SOLUTION, CONCENTRATE INTRAVENOUS at 12:04

## 2021-06-21 RX ADMIN — DEXMEDETOMIDINE HYDROCHLORIDE 2 MCG: 100 INJECTION, SOLUTION, CONCENTRATE INTRAVENOUS at 12:07

## 2021-06-21 RX ADMIN — DEXMEDETOMIDINE HYDROCHLORIDE 2 MCG: 100 INJECTION, SOLUTION, CONCENTRATE INTRAVENOUS at 13:25

## 2021-06-21 RX ADMIN — PROPOFOL 50 MCG/KG/MIN: 10 INJECTION, EMULSION INTRAVENOUS at 11:57

## 2021-06-21 RX ADMIN — PROPOFOL 50 MG: 10 INJECTION, EMULSION INTRAVENOUS at 11:29

## 2021-06-21 RX ADMIN — SODIUM CHLORIDE, POTASSIUM CHLORIDE, SODIUM LACTATE AND CALCIUM CHLORIDE 25 ML/HR: 600; 310; 30; 20 INJECTION, SOLUTION INTRAVENOUS at 11:26

## 2021-06-21 RX ADMIN — PROPOFOL 30 MG: 10 INJECTION, EMULSION INTRAVENOUS at 11:33

## 2021-06-21 NOTE — PERIOP NOTES
Shabbir Farley  1975  [id number]    Situation:  Verbal report given from: [address]  Procedure: Procedure(s):  LEFT BASAL JOINT ARTHROPLASTYWITH TIGHTROPE (REGIONAL BLOCK W/MAC)    Background:    Preoperative diagnosis: Primary osteoarthritis of first carpometacarpal joint of left hand [M18.12]    Postoperative diagnosis: Primary osteoarthritis of first carpometacarpal joint of left hand [M18.12]    :  Dr. Tk Neri    Assistant(s): Circ-1: Ana Harrington RN  Scrub Tech-1: Juan Ortiz    Specimens: * No specimens in log *    Assessment:  Intra-procedure medications         Anesthesia gave intra-procedure sedation and medications, see anesthesia flow sheet     Intravenous fluids: LR@ KVO     Vital signs stable       Recommendation:    Permission to share finding with father

## 2021-06-21 NOTE — PERIOP NOTES
Dr. John Teixeira performed nerve block in preop using ultrasound machine to LUE. Pt on CM x3, 02 by NC at 3L, patient responsive when spoken to. Able to answer questions appropriately. Pt did receive Propofol 70 mg IV given by Dr. John Teixeira for sedation. Pt tolerated procedure well. VSS and will continue to monitor.

## 2021-06-21 NOTE — PERIOP NOTES
1401  Pt received in PACU sedate, arousable, oral airway in place    1415  Nods head to answer questions, oral airway removed    1425  Voided over 400cc clear yellow urine on bedpan, c/o throat being sore.      1431  Taking ice chips, throat feels better

## 2021-06-21 NOTE — ANESTHESIA PREPROCEDURE EVALUATION
Relevant Problems   NEUROLOGY   (+) Depression      ENDOCRINE   (+) DM type 2 (diabetes mellitus, type 2) (HCC)   (+) Severe obesity (HCC)       Anesthetic History     PONV          Review of Systems / Medical History  Patient summary reviewed, nursing notes reviewed and pertinent labs reviewed    Pulmonary    COPD: moderate               Neuro/Psych         Psychiatric history     Cardiovascular  Within defined limits                Exercise tolerance: >4 METS     GI/Hepatic/Renal     GERD      PUD     Endo/Other    Diabetes: type 2, using insulin    Morbid obesity and arthritis     Other Findings   Comments: S/p gastric bypass          Physical Exam    Airway  Mallampati: II  TM Distance: 4 - 6 cm  Neck ROM: normal range of motion   Mouth opening: Normal     Cardiovascular  Regular rate and rhythm,  S1 and S2 normal,  no murmur, click, rub, or gallop             Dental    Dentition: Edentulous     Pulmonary  Breath sounds clear to auscultation               Abdominal  GI exam deferred       Other Findings            Anesthetic Plan    ASA: 3  Anesthesia type: regional - brachial plexus block          Induction: Intravenous  Anesthetic plan and risks discussed with: Patient

## 2021-06-21 NOTE — ANESTHESIA PROCEDURE NOTES
Peripheral Block    Start time: 6/21/2021 11:28 AM  End time: 6/21/2021 11:34 AM  Performed by: Susan Arguello MD  Authorized by: Susan Arguello MD       Pre-procedure: Indications: at surgeon's request and post-op pain management    Preanesthetic Checklist: patient identified, risks and benefits discussed, site marked, timeout performed, anesthesia consent given and patient being monitored      Block Type:   Block Type:  Axillary  Laterality:  Left  Monitoring:  Standard ASA monitoring, frequent vital sign checks, responsive to questions and oxygen  Injection Technique:  Single shot  Procedures: ultrasound guided    Prep: chlorhexidine    Location:  Supraclavicular  Needle Type:  Stimuplex  Needle Gauge:  22 G  Needle Localization:  Ultrasound guidance    Assessment:  Number of attempts:  1  Injection Assessment:  Incremental injection every 5 mL, local visualized surrounding nerve on ultrasound, negative aspiration for blood, ultrasound image on chart, no intravascular symptoms and no paresthesia  Patient tolerance:  Patient tolerated the procedure well with no immediate complications  Standard monitors applied, 3 L NC O2, and sedation given as recorded by RN so as to achieve patient comfort and anxiolysis, but maintain meaningful verbal contact. Sterile prep followed by 1-2 mL local at insertion site. A 40 mm, 22G insulated stimiplex needle was inserted in the interscalene groove, approximately one centimeter from the mid-clavicle. The nerve bundle was identified under [address] [facility] guidance. Local anesthetic injected without resistance and with gentle aspiration in 3-5 ml increments. Patient tolerated procedure well. No pain, paresthesia, or blood noted. VSS throughout. No complications noted.

## 2021-06-21 NOTE — PERIOP NOTES
Permission received to review discharge instructions and discuss private health information with Miriam Umaña, father.

## 2021-06-21 NOTE — ANESTHESIA POSTPROCEDURE EVALUATION
Procedure(s):  LEFT BASAL JOINT ARTHROPLASTYWITH TIGHTROPE (REGIONAL BLOCK W/MAC). regional    Anesthesia Post Evaluation        Patient location during evaluation: PACU  Note status: Adequate. Level of consciousness: responsive to verbal stimuli and sleepy but conscious  Pain management: satisfactory to patient  Airway patency: patent  Anesthetic complications: no  Cardiovascular status: acceptable  Respiratory status: acceptable  Hydration status: acceptable  Comments: +Post-Anesthesia Evaluation and Assessment    Patient: Cadence Lance MRN: [id number]  SSN: [id number]   YOB: 1975  Age: 54 y.o. Sex: female      Cardiovascular Function/Vital Signs    /74   Pulse 95   Temp 36.7 °C (98 °F)   Resp 29   Ht 5' 2.5\" (1.588 m)   Wt 108.4 kg (239 lb)   SpO2 95%   BMI 43.02 kg/m²     Patient is status post Procedure(s):  LEFT BASAL JOINT ARTHROPLASTYWITH TIGHTROPE (REGIONAL BLOCK W/MAC). Nausea/Vomiting: Controlled. Postoperative hydration reviewed and adequate. Pain:  Pain Scale 1: Numeric (0 - 10) (06/21/21 1430)  Pain Intensity 1: 0 (06/21/21 1430)   Managed. Neurological Status:   Neuro (WDL): Exceptions to WDL (06/21/21 1404)   At baseline. Mental Status and Level of Consciousness: Arousable. Pulmonary Status:   O2 Device: Nasal cannula (06/21/21 1415)   Adequate oxygenation and airway patent. Complications related to anesthesia: None    Post-anesthesia assessment completed. No concerns. Signed By: Lacey Rossi MD    6/21/2021  Post anesthesia nausea and vomiting:  controlled      INITIAL Post-op Vital signs:   Vitals Value Taken Time   /74 06/21/21 1430   Temp 36.7 °C (98 °F) 06/21/21 1404   Pulse 93 06/21/21 1443   Resp 21 06/21/21 1443   SpO2 97 % 06/21/21 1443   Vitals shown include unvalidated device data.

## 2021-06-21 NOTE — H&P
Comes today for follow-up of her left basal joint arthritis. I previously seen her for this issue and performed injections in the past. She reports she fell and heard a pop in the thumb about 3 and half weeks ago. Has had worsening of pain since. Has had no recent treatment. PMH, PSH, ROS reviewed on intake form    Objective:   Constitutional: No acute distress. Well nourished. Well developed. Eyes: Sclera are nonicteric. Respiratory: No labored breathing. Cardiovascular: No marked edema. Skin: No marked skin ulcers. Neurological: see below  Psychiatric: Alert and oriented x3. Musculoskeletal   Examination of the left hand demonstrates she has an exquisitely unstable CMC joint of the thumb. It is tender to palpation. I can not easily reduce it and dislocated. Neurovascularly intact distally. Radiographs:   X-rays are ordered and reviewed and independently interpreted by myself today. There is dislocation of the left thumb CMC joint. There is degenerative change present here as well. Possible either osteophytes verses bony fragments. Assessment:     1. Primary osteoarthritis of first carpometacarpal joint of left hand     Plan:   Discussed nature of condition as well as treatment options. At this time she has dislocated her thumb CMC joint. Have recommended surgical management for this. This will include trapezii ectomy as well as stabilization with soft tissue procedures and a tight rope. Risks benefits and alternatives as well as postoperative course are discussed. She consents to proceed. Unfortunately she reports she is not under able to undergo surgical management and tell later in June. In the meantime have given her a thumb spica brace to protect the basal joint. Date of Surgery Update:  Bud Celeste was seen and examined. History and physical has been reviewed. The patient has been examined.  There have been no significant clinical changes since the completion of the originally dated History and Physical.    Signed By: Mahnaz Lim MD     June 21, 2021 10:58 AM

## 2021-06-21 NOTE — BRIEF OP NOTE
Brief Postoperative Note    Patient: Rome Biswas  YOB: 1975  MRN: [id number]    Date of Procedure: 6/21/2021     Pre-Op Diagnosis: Primary osteoarthritis of first carpometacarpal joint of left hand [M18.12]    Post-Op Diagnosis: Same as preoperative diagnosis. Procedure(s):  LEFT BASAL JOINT ARTHROPLASTYWITH TIGHTROPE (REGIONAL BLOCK W/MAC)    Surgeon(s):  Zofia Howe MD    Surgical Assistant: None    Anesthesia: Regional     Estimated Blood Loss (mL): Minimal (83 m TT)    Complications: None    Specimens: * No specimens in log *     Implants:   Implant Name Type Inv.  Item Serial No.  Lot No. LRB No. Used Action   KIT IMPL MINI TIGHTROPE 1.1MM --  - SNA  KIT IMPL MINI TIGHTROPE 1.1MM --  NA [id number] Left 1 Implanted       Drains: * No LDAs found *    Findings: as expected    Electronically Signed by Cesar Morgan MD on 6/21/2021 at 2:06 PM

## 2021-06-21 NOTE — DISCHARGE INSTRUCTIONS
[facility]  Post-operative instructions  For: [address] [facility] first postop appointment should be scheduled with Dr. Nicolas Crigler for 2 weeks post-op. [address], [address]  [address], [place]  Phone: [phone]  Hand Therapy Phone: [phone]  Fax: [phone]    Please follow these instructions for a safe and speedy recovery:    1. Surgical Bandage: Leave the bandage in place until we remove it. Please keep it clean and dry. To shower or bathe, apply a plastic bag or GLAD Press'n Seal® plastic wrap around the bandage or simply sponge bathe. 2. Elevation: Hand swelling is best prevented by keeping your hand elevated above the level of your heart at all times, night and day. The opposite, dangling your hand below your waist, will cause additional pain, swelling, and later stiffness. You can elevate the hand in a sling or by propping it on a pillow at night. Occasionally, we will provide you with a custom-made foam block for elevating the arm. Ice compresses may help but do not rep[lace elevation. Frequently, extreme pain is caused by a tight bandage, which should be loosened. If pain is severe and progressive, call us at [phone] during the day (ask for immediate connection to Dr. Jennifer Paige Team) or during the night (ask for the on-call physician). 3. Medication: You will be provided with an appropriate pain medication (over-the-counter or prescription). Please fill this at a pharmacy promptly so you will have it available when all local anesthetic wears off. Take this to relieve pain as directed on the bottle. Please refrain from driving, drinking alcohol, and making important medical decisions while taking the medication. Please call us if you need something stronger. Medication changes or refills must be made before 5pm or through your pharmacy.     4. Weight bearing: Do NOT bear any weight on the operative extremity. I want to thank you for choosing us for your hand care needs. My staff and I are committed to providing all our customers with the highest quality hand surgery and subsequent hand therapy care as possible. We want your recovery to be comfortable. If you have clinical non-emergent questions about your surgery or other hand conditions please call [phone] and ask for my team. Your call will be returned within 24 hours. DO NOT TAKE TYLENOL/ACETAMINOPHEN WITH PERCOCET, [place], [address] TAKE NARCOTIC PAIN MEDICATIONS WITH FOOD! For the night of surgery, while block is still in effect, start with 1 pain pill at bedtime    Narcotics tend to be constipating and we recommend taking a stool softener such as Colace or Miralax (follow package instructions). If you were given prescriptions, please review the written information on the prescribed medications. DO NOT DRIVE WHILE TAKING NARCOTIC PAIN MEDICATIONS. CPAP PATIENTS BE SURE TO WEAR MACHINE WHENEVER NAPPING OR SLEEPING DAY/NIGHT OF SURGERY! DISCHARGE SUMMARY from Nurse    The following personal items collected during your admission are returned to you:   Dental Appliance: Dental Appliances: None  Vision: Visual Aid: Glasses, At home  Hearing Aid:    Jewelry: Jewelry: None  Clothing: Clothing: With patient  Other Valuables: Other Valuables: None  Valuables sent to safe:        PATIENT INSTRUCTIONS:    After General Anesthesia or Intravenous Sedation, for 24 hours or while taking prescription Narcotics:  · Someone should be with you for the next 24 hours. · For your own safety, a responsible adult must drive you home. · Limit your activities  · Recommended activity: Rest today, up with assistance today. Do not climb stairs or shower unattended for the next 24 hours. · Start with a soft bland diet and advance as tolerated (no nausea) to regular diet.   · If you have a sore throat some things that may help are: fluids, warm salt water gargle, or throat lozenges. If this does not improve after several days please follow up with your family physician. · Do not drive and operate hazardous machinery  · Do not make important personal or business decisions  · Do  not drink alcoholic beverages  · If you have not urinated within 8 hours after discharge, please contact your surgeon on call. Report the following to your surgeon:  · Excessive pain, swelling, redness or odor of or around the surgical area  · Temperature over 100.5  · Nausea and vomiting lasting longer than 4 hours or if unable to take medications  · Any signs of decreased circulation or nerve impairment to extremity: change in color, persistent  numbness, tingling, coldness or increase pain    · If you received an upper extremity nerve block, please wear your sling until the block has worn off, then refer to your surgeons post-operative instructions. If you have had a shoulder block or a block near your collar bone, you may have              symptoms such as:          1. Mild shortness of breath        2. A hoarse voice        3. Blurry vision        4. Unequal pupils        5. Drooping of your face on the same side as the nerve block. These symptoms will disappear as the nerve block wears off. · You will receive a Post Operative Call from one of the Recovery Room Nurses on the day after your surgery to check on you. It is very important for us to know how you are recovering after your surgery. If you have an issue please call your surgeon, do not wait for the post operative call. · You may receive an e-mail or letter in the mail from [employer] regarding your experience with us in the Ambulatory Surgery Unit. Your feedback is valuable to us and we appreciate your participation in the survey.    ·   · If the above instructions are not adequate or you are having problems after your surgery, call your physician at their office number. ·   · We wish youre a speedy recovery ? TO PREVENT AN INFECTION      1. [address] YOUR HANDS     To prevent infection, good handwashing is the most important thing you or your caregiver can do.  Wash your hands with soap and water or use the hand  we gave you before you touch any wounds. 2. SHOWER     Use the antibacterial soap we gave you when you take a shower.  Shower with this soap until your wounds are healed.  To reach all areas of your body, you may need someone to help you.  Dont forget to clean your belly button with every shower. 3.  USE CLEAN SHEETS     Use freshly cleaned sheets on your bed after surgery.  To keep the surgery site clean, do not allow pets to sleep with you while your wound is still healing. 4. STOP SMOKING     Stop smoking, or at least cut back on smoking     Smoking slows your healing. 5.  CONTROL YOUR BLOOD SUGAR     High blood sugars slow wound healing. If you are diabetic, control your blood sugar levels before and after your surgery. What to do at Home:    *  Please give a list of your current medications to your Primary Care Provider. *  Please update this list whenever your medications are discontinued, doses are      changed, or new medications (including over-the-counter products) are added. *  Please carry medication information at all times in case of emergency situations. If you have not had your influenza or pneumococcal vaccines, please follow up with your primary care physician. The discharge information has been reviewed with the patient and caregiver. The patient and caregiver verbalized understanding.

## 2021-06-22 NOTE — OP NOTES
PATIENT NAME:  Mayra Ramsey    SURGEON:    Heidy French MD    DATE OF SURGERY: 6/22/2021     LOCATION: [facility]     PREOPERATIVE DIAGNOSIS:  Left basal joint arthritis with thumb CMC dislocation    POSTOPERATIVE DIAGNOSIS:  Same    PROCEDURE:  Left thumb carpometacarpal interposition arthroplasty                             Left flexor carpi radialis - Abductor pollicis longis tendon sling suspension with Arthrex tight rope        ANESTHESIA: Regional with MAC     BLOOD LOSS:  Minimal    COMPLICATIONS: none     TOURNIQUET TIME:  83 min    Assistant: Christina Best PA-C     OPERATIVE INDICATIONS:  This 54 y.o. old  Female has developed progressive painful left thumb carpometacarpal osteoarthritis, which has become unresponsive to conservative treatment and progressed to [facility] dislocation. The patient has now elected to undergo a thumb carpometacarpal interposition arthroplasty with additional tendon sling suspension, using a transferred FCR and APL tendon and tightrope stabilization. I have explained the nature of such a surgery, the usual outcome, and also the risk for an outcome with residual pain or instability of the metacarpal. I have outlined additional risks of surgery, including but not exclusive to bleeding, pain, infection, numbness, or even the potential need for revision surgery. The patient elects to proceed today. PROCEDURE: The patient was brought to the operating room and placed on the table in the supine position. The left hand and arm were prepped and draped in a sterile field. The operative site was confirmed during the timeout. The patient was given an appropriate dose of IV antibiotics. The arm was exsanguinated and the brachial tourniquet was inflated to 250 mm Hg. A timeout was called and the operative site was confirmed.                A curvilinear Hernandez incision was made over the thenar eminence   At the interval between the glabrous and non glabrous skin exposing the thenar muscles. Dissection was carried down protecting the sensory nerve branches. The thenar fascia was incised and thenar muscle was reflected from the carpometacarpal joint capsule and base of the thumb metacarpal ulnarly. Then a  longitudinal arthrotomy was made, reflecting distally-based capsular flaps and allowing for good exposure of the severely arthritic carpometacarpal joint. The radial artery was protected in the proximal snuffbox. The trapezium was then carefully dissected free of its adhesions. Osteophytes were removed off the trapezium. Care was taken to avoid any damage to the FCR in its tunnel and the APL tendon. Using a McGlamery   Retractor and a threaded Steinmann pin the trapezium was removed as a whole. The arthroplasty bed was then carefully inspected and all remaining osteophytes were removed. Fluoroscopic imaging demonstrated removal of all bony tissue. Following this the scaphoid- trapezoid joint was evaluated. Cartilage remained noted in this joint. Following this, the thumb metacarpal was reduced into place and the Arthrex tightrope was predrilled for in this reduced position. This was confirmed on x-rays. The tighrope was then placed from the thumb metacarpal base to the index metacarpal base according to product technique and tied down. This did create good stability of the thumb metacarpal and good positioning. This was confirmed on x-rays. Attention was then turned towards the suspensionplasty. A slip of the Apl was carefully dissected free and truncated at the most proximal extent in the wound. A #2 FiberWire was used in a figure-of-eight fashion looping from the proximal APL through the   DistalFCR and back to the APL  To sling in the metacarpal base. This was then tied over the APL. This was then again performed in the same fashion for 2 figure-of-eight slings under the metacarpal.   This protected the trapeziectomy bed well.   The thumb MP joint was evaluated and no hyperextension was noted. Thrombin-soaked Gelfoam was then placed in the arthroplasty cavity. The capsule overlying the cavity was then repaired with 3-0  Ethibond. The tourniquet then was deflated and hemostasis was attained. Copious irrigation was performed. Skin was then closed with a 4-0  Monocryl 1st in an interrupted deep dermal layer and then in a continuous subcuticular layer at the thenar incision and 3-0 nylon at the dorsal hand. A well-padded thumb spica dressing was applied as well as a thumb spica splint. The patient tolerated the procedure uneventfully and was discharged to the recovery area in good condition. During the procedure, a physician assistant was vital to the outcome the case providing stability to the arm, retraction of crucial structures, assistance with wound closure, assistance with handling soft tissue and dressing application.

## 2021-06-27 ENCOUNTER — HOSPITAL ENCOUNTER (EMERGENCY)
Age: 46
Discharge: HOME | End: 2021-06-27
Attending: EMERGENCY MEDICINE
Payer: MEDICAID

## 2021-06-27 ENCOUNTER — APPOINTMENT (OUTPATIENT)
Dept: GENERAL RADIOLOGY | Age: 46
End: 2021-06-27
Attending: EMERGENCY MEDICINE
Payer: MEDICAID

## 2021-06-27 VITALS
RESPIRATION RATE: 17 BRPM | SYSTOLIC BLOOD PRESSURE: 126 MMHG | WEIGHT: 237.44 LBS | HEIGHT: 61 IN | HEART RATE: 90 BPM | TEMPERATURE: 98.7 F | DIASTOLIC BLOOD PRESSURE: 68 MMHG | OXYGEN SATURATION: 98 % | BODY MASS INDEX: 44.83 KG/M2

## 2021-06-27 DIAGNOSIS — J06.9 UPPER RESPIRATORY TRACT INFECTION, UNSPECIFIED TYPE: Primary | ICD-10-CM

## 2021-06-27 PROCEDURE — 71046 X-RAY EXAM CHEST 2 VIEWS: CPT

## 2021-06-27 PROCEDURE — 99283 EMERGENCY DEPT VISIT LOW MDM: CPT

## 2021-06-27 RX ORDER — ALBUTEROL SULFATE 1.25 MG/3ML
1.25 SOLUTION RESPIRATORY (INHALATION)
Qty: 25 EACH | Refills: 0 | Status: SHIPPED | OUTPATIENT
Start: 2021-06-27

## 2021-06-27 RX ORDER — PREDNISONE 20 MG/1
40 TABLET ORAL DAILY
Qty: 10 TABLET | Refills: 0 | Status: SHIPPED | OUTPATIENT
Start: 2021-06-27 | End: 2021-07-02

## 2021-06-27 RX ORDER — AMOXICILLIN 500 MG/1
500 TABLET, FILM COATED ORAL 3 TIMES DAILY
Qty: 21 TABLET | Refills: 0 | Status: SHIPPED | OUTPATIENT
Start: 2021-06-27 | End: 2021-07-04

## 2021-06-27 RX ORDER — AZITHROMYCIN 250 MG/1
TABLET, FILM COATED ORAL
Qty: 6 TABLET | Refills: 0 | Status: SHIPPED | OUTPATIENT
Start: 2021-06-27 | End: 2021-07-02

## 2021-06-27 NOTE — ED PROVIDER NOTES
EMERGENCY DEPARTMENT HISTORY AND PHYSICAL EXAM      Date: 6/27/2021  Patient Name: Nini Lamb    History of Presenting Illness     Chief Complaint   Patient presents with    Cough     Ambulatory into the ED with c/o congested / productive cough x 6 days - sx started the day after she had Rt wrist surgery. PCP prescribed Levaquin 5 days ago for bronchitis. She has been taking the abx but isn't feeling any better. She is a current every day smoker. No distress noted. History Provided By: Patient    HPI: Nini Lamb, 54 y.o. smoking female with PMHx of DM2, depression, chronic pain 2/2 arthritis, s/p gastric bypass, presents BIB self to the ED with cc of cough x 6 days. Cough is described as dry and wheezy with occasional sputum production. Patient complains of generalized chest and abdominal pain felt with coughing episodes only. Admits nasal congestion. Denies fever/chills, hemoptysis, vomiting, diarrhea, rashes. Patient is reportedly Covid vaccinated. Patient has been eating and drinking without difficulty. Patient has a nebulizer machine at home but ran out of her albuterol solution. Patient's PCP called in Levaquin 5 days ago, which she has been taking without relief. No relief with OTC decongestants. Of note, patient did have thumb surgery 6 days ago; she was not under general anesthesia. There are no other complaints, changes, or physical findings at this time. PCP: Kristian Heard MD    No current facility-administered medications on file prior to encounter. Current Outpatient Medications on File Prior to Encounter   Medication Sig Dispense Refill    oxyCODONE IR (ROXICODONE) 5 mg immediate release tablet Take 1 Tablet by mouth every four (4) hours as needed for Pain for up to 14 days. Max Daily Amount: 30 mg. 40 Tablet 0    insulin glargine (Lantus Solostar U-100 Insulin) 100 unit/mL (3 mL) inpn 40 Units by SubCUTAneous route nightly.       multivitamin (ONE A DAY) tablet Take 1 Tablet by mouth daily.  ALPRAZolam (XANAX) 1 mg tablet Take 1 mg by mouth two (2) times daily as needed.  diphenhydrAMINE (ALLERGY RELIEF,DIPHENHYDRAMIN,) 25 mg capsule Take 25 mg by mouth nightly.  venlafaxine-SR (EFFEXOR XR) 75 mg capsule Take 75 mg by mouth daily.  INTRAUTERINE DEVICE, IUD, IU by IntraUTERine route.  PANTOPRAZOLE SODIUM (PROTONIX PO) Take 40 mg by mouth daily.          Past History     Past Medical History:  Past Medical History:   Diagnosis Date    Arthritis     Cholesteatoma     Chronic pain     arthritis    COVID-19 2021    Depression     DM type 2 (diabetes mellitus, type 2) ([facility] 2009    GERD (gastroesophageal reflux disease)     mostly before gastric bypass/has again now    H/O gastric bypass     Ill-defined condition     anastomatic stricture    Morbid obesity (HCC)     Nausea & vomiting     with anesthesia    Non-nicotine vapor product user     Panic attacks     PUD (peptic ulcer disease)        Past Surgical History:  Past Surgical History:   Procedure Laterality Date    HX CARPAL TUNNEL RELEASE      right    HX CHOLECYSTECTOMY      HX GASTRIC BYPASS      2016    HX GASTRIC BYPASS  2021    revision, stricture resolved and pouch fistula reapaired    HX HEENT      3 surgeries - hole in eardrum x 2 - 2 surgeries to repair this/third surgery to removed cholesteatoma    HX ORTHOPAEDIC      plantar fasciitis - right    HX OTHER SURGICAL      EGD - ulcer/dilatation    HX TONSILLECTOMY      MULTIPLE DELIVERY       x3       Family History:  Family History   Problem Relation Age of Onset    Depression Mother     Other Mother         osteopenia/polycythemia vera/fibormyalgia    Depression Sister     Cancer Other         non-lymphocytic leukemia    Heart Disease Paternal Uncle     Alzheimer Maternal Grandmother     Alzheimer Paternal Grandmother     Dementia Maternal Aunt        Social History:  Social History     Tobacco Use    Smoking status: Current Every Day Smoker     Packs/day: 0.50    Smokeless tobacco: Current User   Substance Use Topics    Alcohol use: Not Currently    Drug use: No       Allergies: Allergies   Allergen Reactions    Allegra [Fexofenadine] Hives         Review of Systems   Review of Systems   Constitutional: Negative for fever. HENT: Positive for congestion. Eyes: Negative for redness. Respiratory: Positive for cough and wheezing. Negative for stridor. Cardiovascular: Negative for leg swelling. Gastrointestinal: Negative for vomiting. Endocrine: Negative for polydipsia. Genitourinary: Negative for dysuria. Musculoskeletal: Negative for gait problem. Skin: Negative for rash. Allergic/Immunologic: Negative for immunocompromised state. Neurological: Negative for dizziness. Hematological: Does not bruise/bleed easily. Psychiatric/Behavioral: Negative for agitation. Physical Exam   Physical Exam  Vitals and nursing note reviewed. Constitutional:       General: She is not in acute distress. Appearance: Normal appearance. She is well-developed. She is not toxic-appearing. HENT:      Head: Normocephalic and atraumatic. Right Ear: Tympanic membrane normal.      Left Ear: Tympanic membrane normal.      Nose: Congestion present. Mouth/Throat:      Mouth: Mucous membranes are moist.      Pharynx: No oropharyngeal exudate or posterior oropharyngeal erythema. Eyes:      General: Lids are normal.      Extraocular Movements: Extraocular movements intact. Conjunctiva/sclera: Conjunctivae normal.   Cardiovascular:      Rate and Rhythm: Normal rate and regular rhythm. Pulmonary:      Effort: Pulmonary effort is normal. No respiratory distress. Breath sounds: No stridor. No rhonchi. Comments: Inspiratory and expiratory wheezing, rales appreciated throughout all lung fields  Abdominal:      Palpations: Abdomen is soft. Tenderness: There is no abdominal tenderness. There is no guarding. Musculoskeletal:         General: Normal range of motion. Cervical back: Normal range of motion and neck supple. Comments: No calf edema, erythema, or tenderness. Normal gait. Skin:     General: Skin is warm and dry. Neurological:      General: No focal deficit present. Mental Status: She is alert and oriented to person, place, and time. Psychiatric:         Mood and Affect: Mood normal.         Behavior: Behavior normal. Behavior is cooperative. Diagnostic Study Results     Labs -   No results found for this or any previous visit (from the past 12 hour(s)). Radiologic Studies -   XR CHEST PA LAT   Final Result      Normal chest views. No change. CT Results  (Last 48 hours)    None        CXR Results  (Last 48 hours)               06/27/21 1724  XR CHEST PA LAT Final result    Impression:      Normal chest views. No change. Narrative:  EXAM: XR CHEST PA LAT       INDICATION: Productive cough. COMPARISON: Chest views on 4/14/2021 and 4/27/2019. TECHNIQUE: PA and lateral chest views       FINDINGS: The cardiomediastinal and hilar contours are within normal limits. The   pulmonary vasculature is within normal limits. The lungs and pleural spaces are clear. The visualized bones and upper abdomen   are age-appropriate. Medical Decision Making   I am the first provider for this patient. I reviewed the vital signs, available nursing notes, past medical history, past surgical history, family history and social history. Vital Signs-Reviewed the patient's vital signs. Patient Vitals for the past 12 hrs:   Temp Pulse Resp BP SpO2   06/27/21 1918  90 17 126/68 98 %   06/27/21 1705 98.7 °F (37.1 °C) 97 13 (!) 147/71 99 %       Records Reviewed: Nursing Notes and Old Medical Records    Provider Notes (Medical Decision Making):      Though the patient denies prior diagnosis of asthma or COPD, her presentation is suspicious for COPD exacerbation. The patient did have a surgery 6 days ago, however it was under regional anesthesia only and I do not feel it puts her at significant increased risk of PE. She is otherwise PERC negative. Reviewed x-ray findings with patient. She is agreeable to discharge with plans for azithromycin, amoxicillin, prednisone, albuterol nebulizer solution. Recommended smoking cessation. Recommended prompt follow-up with PCP. Strict ED return precautions given. Patient is in agreement this plan. ED Course:   Initial assessment performed. The patients presenting problems have been discussed, and they are in agreement with the care plan formulated and outlined with them. I have encouraged them to ask questions as they arise throughout their visit. Critical Care Time: None    Disposition:  D/c    PLAN:  1. Discharge Medication List as of 6/27/2021  7:07 PM      START taking these medications    Details   predniSONE (DELTASONE) 20 mg tablet Take 40 mg by mouth daily for 5 days. With Breakfast, Normal, Disp-10 Tablet, R-0      azithromycin (Zithromax Z-Bruce) 250 mg tablet Take 2 pills on day 1, take 1 pill on days 2 through 5., Normal, Disp-6 Tablet, R-0      amoxicillin 500 mg tab Take 500 mg by mouth three (3) times daily for 7 days. , Normal, Disp-21 Tablet, R-0      albuterol (ACCUNEB) 1.25 mg/3 mL nebu Take 3 mL by inhalation every four (4) hours as needed for Wheezing (wheezing). , Normal, Disp-25 Each, R-0         CONTINUE these medications which have NOT CHANGED    Details   oxyCODONE IR (ROXICODONE) 5 mg immediate release tablet Take 1 Tablet by mouth every four (4) hours as needed for Pain for up to 14 days.  Max Daily Amount: 30 mg., Normal, Disp-40 Tablet, R-0      insulin glargine (Lantus Solostar U-100 Insulin) 100 unit/mL (3 mL) inpn 40 Units by SubCUTAneous route nightly., Historical Med      multivitamin (ONE A DAY) tablet Take 1 Tablet by mouth daily. , Historical Med      ALPRAZolam (XANAX) 1 mg tablet Take 1 mg by mouth two (2) times daily as needed., Historical Med      diphenhydrAMINE (ALLERGY RELIEF,DIPHENHYDRAMIN,) 25 mg capsule Take 25 mg by mouth nightly., Historical Med      venlafaxine-SR (EFFEXOR XR) 75 mg capsule Take 75 mg by mouth daily. , Historical Med      INTRAUTERINE DEVICE, IUD, IU by IntraUTERine route., Historical Med      PANTOPRAZOLE SODIUM (PROTONIX PO) Take 40 mg by mouth daily. , Historical Med           2. Follow-up Information     Follow up With Specialties Details Why Contact Info    [facility] EMERGENCY DEPT Emergency Medicine  As needed, If symptoms worsen [address] Joan Wolf MD Family Medicine Call  For follow up [address]  [phone]          Return to ED if worse     Diagnosis     Clinical Impression:   1. Upper respiratory tract infection, unspecified type          Please note that this dictation was completed with [employer], the computer voice recognition software. Quite often unanticipated grammatical, syntax, homophones, and other interpretive errors are inadvertently transcribed by the computer software. Please disregards these errors. Please excuse any errors that have escaped final proofreading.

## 2021-06-27 NOTE — ED NOTES
Discharge instructions and Rx reviewed with and given to pt by ER RN. No changes, pt ambulatory on own accord for discharge.

## 2021-10-14 ENCOUNTER — APPOINTMENT (OUTPATIENT)
Dept: GENERAL RADIOLOGY | Age: 46
End: 2021-10-14
Attending: EMERGENCY MEDICINE
Payer: MEDICAID

## 2021-10-14 ENCOUNTER — HOSPITAL ENCOUNTER (EMERGENCY)
Age: 46
Discharge: HOME | End: 2021-10-14
Attending: EMERGENCY MEDICINE
Payer: MEDICAID

## 2021-10-14 VITALS
WEIGHT: 234.57 LBS | HEIGHT: 61 IN | TEMPERATURE: 98.2 F | BODY MASS INDEX: 44.29 KG/M2 | SYSTOLIC BLOOD PRESSURE: 144 MMHG | HEART RATE: 97 BPM | OXYGEN SATURATION: 100 % | RESPIRATION RATE: 16 BRPM | DIASTOLIC BLOOD PRESSURE: 83 MMHG

## 2021-10-14 DIAGNOSIS — M25.532 ACUTE PAIN OF LEFT WRIST: ICD-10-CM

## 2021-10-14 DIAGNOSIS — S63.502A SPRAIN OF LEFT WRIST, INITIAL ENCOUNTER: Primary | ICD-10-CM

## 2021-10-14 PROCEDURE — 99282 EMERGENCY DEPT VISIT SF MDM: CPT

## 2021-10-14 PROCEDURE — 73110 X-RAY EXAM OF WRIST: CPT

## 2021-10-14 RX ORDER — HYDROCODONE BITARTRATE AND ACETAMINOPHEN 5; 325 MG/1; MG/1
1 TABLET ORAL
Qty: 9 TABLET | Refills: 0 | Status: SHIPPED | OUTPATIENT
Start: 2021-10-14 | End: 2021-10-17

## 2021-10-14 RX ORDER — IBUPROFEN 800 MG/1
800 TABLET ORAL
Qty: 20 TABLET | Refills: 0 | Status: SHIPPED | OUTPATIENT
Start: 2021-10-14 | End: 2021-10-21

## 2021-10-14 NOTE — LETTER
[facility] EMERGENCY DEPT  [address]  [place] [address]  [phone]    Work/School Note    Date: 10/14/2021    To Whom It May concern:    Boyd Franks was seen and treated today in the emergency room by the following provider(s):  Attending Provider: Bret Kennedy MD  Physician Assistant: BERNARDA Carter. Boyd Franks may return to work on 17OCT2021.     Sincerely,          BERNARDA Wu

## 2021-10-15 NOTE — ED PROVIDER NOTES
EMERGENCY DEPARTMENT HISTORY AND PHYSICAL EXAM      Date: 10/14/2021  Patient Name: Ronan Ugalde    History of Presenting Illness     Chief Complaint   Patient presents with    Wrist Pain     pt had a left [facility] arthroplasty with tightrope (?) in june. Today she was looking for something and twisted her arm and felt a pop. Pain from palm to elbow at this time. pain with ROM       History Provided By: Patient    HPI: Ronan Ugalde, 54 y.o. female with history of arthritis, diabetes, depression and others as below presents ambulatory to the ED with cc of an hour or so of 10 out of 10 constant, achy left wrist pain that is much worse with movement and palpation. She tells me she had surgery on her left wrist on June 21 of this year. She tells me she has been doing good and has attended all of her rehabilitation appointments. It was today at home that she was reaching between the cushions of a couch when she twisted her wrist and heard and felt a sudden, intense \"pop\". There was no fall. There has been no fever lately. She is right-hand dominant. She tells me she is a  in [employer] and is due back to work tomorrow. There are no other complaints, changes, or physical findings at this time. PCP: Marcella Lyle MD    Current Outpatient Medications   Medication Sig Dispense Refill    ibuprofen (MOTRIN) 800 mg tablet Take 1 Tablet by mouth every eight (8) hours as needed for Pain for up to 7 days. 20 Tablet 0    HYDROcodone-acetaminophen (NORCO) 5-325 mg per tablet Take 1 Tablet by mouth every eight (8) hours as needed for Pain for up to 3 days. Max Daily Amount: 3 Tablets. 9 Tablet 0    albuterol (ACCUNEB) 1.25 mg/3 mL nebu Take 3 mL by inhalation every four (4) hours as needed for Wheezing (wheezing). 25 Each 0    insulin glargine (Lantus Solostar U-100 Insulin) 100 unit/mL (3 mL) inpn 40 Units by SubCUTAneous route nightly.       multivitamin (ONE A DAY) tablet Take 1 Tablet by mouth daily.  ALPRAZolam (XANAX) 1 mg tablet Take 1 mg by mouth two (2) times daily as needed.  diphenhydrAMINE (ALLERGY RELIEF,DIPHENHYDRAMIN,) 25 mg capsule Take 25 mg by mouth nightly.  venlafaxine-SR (EFFEXOR XR) 75 mg capsule Take 75 mg by mouth daily.  INTRAUTERINE DEVICE, IUD, IU by IntraUTERine route.  PANTOPRAZOLE SODIUM (PROTONIX PO) Take 40 mg by mouth daily.        Past History     Past Medical History:  Past Medical History:   Diagnosis Date    Arthritis     Cholesteatoma     Chronic pain     arthritis    COVID-19 2021    Depression     DM type 2 (diabetes mellitus, type 2) ([facility].) 2009    GERD (gastroesophageal reflux disease)     mostly before gastric bypass/has again now    H/O gastric bypass     Ill-defined condition     anastomatic stricture    Morbid obesity (HCC)     Nausea & vomiting     with anesthesia    Non-nicotine vapor product user     Panic attacks     PUD (peptic ulcer disease)        Past Surgical History:  Past Surgical History:   Procedure Laterality Date    HX CARPAL TUNNEL RELEASE      right    HX CHOLECYSTECTOMY      HX GASTRIC BYPASS      2016    HX GASTRIC BYPASS  2021    revision, stricture resolved and pouch fistula reapaired    HX HEENT      3 surgeries - hole in eardrum x 2 - 2 surgeries to repair this/third surgery to removed cholesteatoma    HX ORTHOPAEDIC      plantar fasciitis - right    HX OTHER SURGICAL      EGD - ulcer/dilatation    HX TONSILLECTOMY      MULTIPLE DELIVERY       x3       Family History:  Family History   Problem Relation Age of Onset    Depression Mother     Other Mother         osteopenia/polycythemia vera/fibormyalgia    Depression Sister     Cancer Other         non-lymphocytic leukemia    Heart Disease Paternal Uncle     Alzheimer Maternal Grandmother     Alzheimer Paternal Grandmother     Dementia Maternal Aunt        Social History:  Social History     Tobacco Use    Smoking status: Current Every Day Smoker     Packs/day: 0.50    Smokeless tobacco: Current User   Substance Use Topics    Alcohol use: Not Currently    Drug use: No       Allergies: Allergies   Allergen Reactions    Allegra [Fexofenadine] Hives     Review of Systems   Review of Systems   Constitutional: Negative for fatigue and fever. HENT: Negative for ear pain and sore throat. Eyes: Negative for pain, redness and visual disturbance. Respiratory: Negative for cough and shortness of breath. Cardiovascular: Negative for chest pain and palpitations. Gastrointestinal: Negative for abdominal pain, nausea and vomiting. Genitourinary: Negative for dysuria, frequency and urgency. Musculoskeletal: Negative for back pain, gait problem, neck pain and neck stiffness. Acute left wrist pain   Skin: Negative for rash and wound. Neurological: Negative for dizziness, weakness, light-headedness, numbness and headaches. Physical Exam   Physical Exam  Vitals and nursing note reviewed. Constitutional:       General: She is not in acute distress. Appearance: She is well-developed. She is not toxic-appearing. HENT:      Head: Normocephalic and atraumatic. Jaw: No trismus. Right Ear: External ear normal.      Left Ear: External ear normal.      Nose: Nose normal.      Mouth/Throat:      Pharynx: Uvula midline. Eyes:      General: No scleral icterus. Conjunctiva/sclera: Conjunctivae normal.      Pupils: Pupils are equal, round, and reactive to light. Cardiovascular:      Rate and Rhythm: Normal rate and regular rhythm. Pulmonary:      Effort: Pulmonary effort is normal. No tachypnea, accessory muscle usage or respiratory distress. Breath sounds: No decreased breath sounds or wheezing. Abdominal:      Palpations: Abdomen is soft. Tenderness: There is no abdominal tenderness. Musculoskeletal:         General: Normal range of motion. Left wrist: Swelling and tenderness present. Cervical back: Full passive range of motion without pain and normal range of motion. Comments:   LEFT WRIST:  Mild dorsal swelling with no bruising or redness  No break in the skin  Well-healed surgical scars near the base of the first [facility] joint  Diffuse dorsal tenderness   Skin:     Findings: No rash. Neurological:      Mental Status: She is alert and oriented to person, place, and time. She is not disoriented. GCS: GCS eye subscore is 4. GCS verbal subscore is 5. GCS motor subscore is 6. Cranial Nerves: No cranial nerve deficit. Psychiatric:         Speech: Speech normal.       Diagnostic Study Results     Labs -   No results found for this or any previous visit (from the past 12 hour(s)). Radiologic Studies -   XR WRIST LT AP/LAT/OBL MIN 3V   Final Result   There is no acute fracture or dislocation. The base of the first   metacarpal is approximately 9 mm proximal to the level of the base of the second   metacarpal. There has been interval presumed resection of the trapezium. Metallic plate overlies the soft tissues lateral to the base of the first   metatarsal. There is also a small metallic plate immediately adjacent to the   proximal diaphysis of the second metacarpal. Osseous structures are diffusely   demineralized. IMPRESSION: No acute fracture. Postoperative changes, as described above. CT Results  (Last 48 hours)    None        CXR Results  (Last 48 hours)    None        Medical Decision Making   I am the first provider for this patient. I reviewed the vital signs, available nursing notes, past medical history, past surgical history, family history and social history. Vital Signs-Reviewed the patient's vital signs.   Patient Vitals for the past 12 hrs:   Temp Pulse Resp BP SpO2   10/14/21 1850 98.2 °F (36.8 °C) 97 16 (!) 144/83 100 %       Pulse Oximetry Analysis - 100% on RA    Records Reviewed: Nursing Notes, Old Medical Records, Previous Radiology Studies, Previous Laboratory Studies and     Provider Notes (Medical Decision Making):   DDx: Fracture, hardware failure, sprain, strain    Plain films demonstrate postoperative changes without acute bony injury or hardware failure. Will place patient in a Velcro prefab thumb spica splint. Will offer pain medication. Will encourage ice. Note for work is provided. Patient is instructed to reach out to her orthopedic surgeon. ED Course:   Initial assessment performed. The patients presenting problems have been discussed, and they are in agreement with the care plan formulated and outlined with them. I have encouraged them to ask questions as they arise throughout their visit. Disposition:  Discharge    PLAN:  1. Discharge Medication List as of 10/14/2021  8:20 PM      START taking these medications    Details   ibuprofen (MOTRIN) 800 mg tablet Take 1 Tablet by mouth every eight (8) hours as needed for Pain for up to 7 days. , Normal, Disp-20 Tablet, R-0      HYDROcodone-acetaminophen (NORCO) 5-325 mg per tablet Take 1 Tablet by mouth every eight (8) hours as needed for Pain for up to 3 days. Max Daily Amount: 3 Tablets., Normal, Disp-9 Tablet, R-0         CONTINUE these medications which have NOT CHANGED    Details   albuterol (ACCUNEB) 1.25 mg/3 mL nebu Take 3 mL by inhalation every four (4) hours as needed for Wheezing (wheezing). , Normal, Disp-25 Each, R-0      insulin glargine (Lantus Solostar U-100 Insulin) 100 unit/mL (3 mL) inpn 40 Units by SubCUTAneous route nightly., Historical Med      multivitamin (ONE A DAY) tablet Take 1 Tablet by mouth daily. , Historical Med      ALPRAZolam (XANAX) 1 mg tablet Take 1 mg by mouth two (2) times daily as needed., Historical Med      diphenhydrAMINE (ALLERGY RELIEF,DIPHENHYDRAMIN,) 25 mg capsule Take 25 mg by mouth nightly., Historical Med      venlafaxine-SR (EFFEXOR XR) 75 mg capsule Take 75 mg by mouth daily. , Historical Med      INTRAUTERINE DEVICE, IUD, IU by IntraUTERine route., Historical Med      PANTOPRAZOLE SODIUM (PROTONIX PO) Take 40 mg by mouth daily. , Historical Med           2. Follow-up Information     Follow up With Specialties Details Why Contact Vick Silva MD Hand Surgery Call  [facility] [address] / HAND: call to schedule follow up [address] [address]  [address]  [phone]          Return to ED if worse     Diagnosis     Clinical Impression:   1. Sprain of left wrist, initial encounter    2.  Acute pain of left wrist

## 2022-01-10 LAB — HBA1C MFR BLD HPLC: 8.7 %

## 2022-02-27 ENCOUNTER — OFFICE VISIT (OUTPATIENT)
Dept: URGENT CARE | Age: 47
End: 2022-02-27
Payer: MEDICAID

## 2022-02-27 VITALS
DIASTOLIC BLOOD PRESSURE: 77 MMHG | WEIGHT: 241.7 LBS | OXYGEN SATURATION: 99 % | HEIGHT: 62 IN | RESPIRATION RATE: 18 BRPM | BODY MASS INDEX: 44.48 KG/M2 | SYSTOLIC BLOOD PRESSURE: 117 MMHG | TEMPERATURE: 98.5 F | HEART RATE: 85 BPM

## 2022-02-27 DIAGNOSIS — J06.9 VIRAL URI WITH COUGH: Primary | ICD-10-CM

## 2022-02-27 PROCEDURE — 99213 OFFICE O/P EST LOW 20 MIN: CPT | Performed by: FAMILY MEDICINE

## 2022-02-27 RX ORDER — OMEPRAZOLE 40 MG/1
40 CAPSULE, DELAYED RELEASE ORAL 2 TIMES DAILY
COMMUNITY

## 2022-02-27 RX ORDER — SUCRALFATE 1 G/1
1 TABLET ORAL 4 TIMES DAILY
COMMUNITY

## 2022-02-27 RX ORDER — LANOLIN ALCOHOL/MO/W.PET/CERES
CREAM (GRAM) TOPICAL
COMMUNITY

## 2022-02-27 RX ORDER — HYDROCHLOROTHIAZIDE 12.5 MG/1
12.5 TABLET ORAL DAILY
COMMUNITY

## 2022-02-27 RX ORDER — BENZONATATE 200 MG/1
200 CAPSULE ORAL
Qty: 21 CAPSULE | Refills: 0 | Status: SHIPPED | OUTPATIENT
Start: 2022-02-27 | End: 2022-03-06

## 2022-02-27 NOTE — PROGRESS NOTES
Cough  The history is provided by the patient. This is a new problem. The current episode started more than 2 days ago. The problem occurs every few minutes. The problem has not changed since onset. The cough is non-productive. There has been no fever. Pertinent negatives include no chills, no headaches, no rhinorrhea, no sore throat, no shortness of breath and no wheezing. She has tried nothing for the symptoms. Risk factors: john covid exposure  She is a smoker. Rash   The history is provided by the patient. This is a new problem. The current episode started more than 2 days ago. The problem has not changed since onset. The problem is associated with an unknown factor. There has been no fever. The rash is present on the right lower leg. Associated symptoms include itching. She has tried nothing for the symptoms.         Past Medical History:   Diagnosis Date    Arthritis     Cholesteatoma     Chronic pain     arthritis    COVID-19 2021    Depression     DM type 2 (diabetes mellitus, type 2) ([facility] 2009    GERD (gastroesophageal reflux disease)     mostly before gastric bypass/has again now    H/O gastric bypass     Ill-defined condition     anastomatic stricture    Morbid obesity (HCC)     Nausea & vomiting     with anesthesia    Non-nicotine vapor product user     Panic attacks     PUD (peptic ulcer disease)         Past Surgical History:   Procedure Laterality Date    HX CARPAL TUNNEL RELEASE      right    HX CHOLECYSTECTOMY      HX GASTRIC BYPASS      2016    HX GASTRIC BYPASS  2021    revision, stricture resolved and pouch fistula reapaired    HX GASTRIC BYPASS  2021    revision    HX HEENT      3 surgeries - hole in eardrum x 2 - 2 surgeries to repair this/third surgery to removed cholesteatoma    HX ORTHOPAEDIC      plantar fasciitis - right    HX OTHER SURGICAL      EGD - ulcer/dilatation    HX TONSILLECTOMY      MULTIPLE DELIVERY       x3 Family History   Problem Relation Age of Onset    Depression Mother     Other Mother         osteopenia/polycythemia vera/fibormyalgia    Depression Sister     Cancer Other         non-lymphocytic leukemia    Heart Disease Paternal Uncle     Alzheimer's Disease Maternal Grandmother     Alzheimer's Disease Paternal Grandmother     Dementia Maternal Aunt         Social History     Socioeconomic History    Marital status: LEGALLY      Spouse name: Not on file    Number of children: Not on file    Years of education: Not on file    Highest education level: Not on file   Occupational History    Not on file   Tobacco Use    Smoking status: Current Every Day Smoker     Packs/day: 0.50    Smokeless tobacco: Current User   Substance and Sexual Activity    Alcohol use: Not Currently    Drug use: No    Sexual activity: Yes     Partners: Male   Other Topics Concern    Not on file   Social History Narrative    Not on file     Social Determinants of Health     Financial Resource Strain:     Difficulty of Paying Living Expenses: Not on file   Food Insecurity:     Worried About Running Out of Food in the Last Year: Not on file    Felecia of Food in the Last Year: Not on file   Transportation Needs:     Lack of Transportation (Medical): Not on file    Lack of Transportation (Non-Medical):  Not on file   Physical Activity:     Days of Exercise per Week: Not on file    Minutes of Exercise per Session: Not on file   Stress:     Feeling of Stress : Not on file   Social Connections:     Frequency of Communication with Friends and Family: Not on file    Frequency of Social Gatherings with Friends and Family: Not on file    Attends Druze Services: Not on file    Active Member of Clubs or Organizations: Not on file    Attends Club or Organization Meetings: Not on file    Marital Status: Not on file   Intimate Partner Violence:     Fear of Current or Ex-Partner: Not on file   [address] [place] [address] Emotionally Abused: Not on file    Physically Abused: Not on file    Sexually Abused: Not on file   Housing Stability:     Unable to Pay for Housing in the Last Year: Not on file    Number of Places Lived in the Last Year: Not on file    Unstable Housing in the Last Year: Not on file                ALLERGIES: Allegra [fexofenadine]    Review of Systems   Constitutional: Negative for chills. HENT: Negative for congestion, postnasal drip, rhinorrhea, sinus pressure, sneezing and sore throat. Respiratory: Positive for cough. Negative for shortness of breath and wheezing. Skin: Positive for itching and rash. Neurological: Negative for headaches. All other systems reviewed and are negative. Vitals:    02/27/22 1126   BP: 117/77   Pulse: 85   Resp: 18   Temp: 98.5 °F (36.9 °C)   SpO2: 99%   Weight: 241 lb 11.2 oz (109.6 kg)   Height: 5' 2\" (1.575 m)       Physical Exam  Vitals and nursing note reviewed. Constitutional:       General: She is not in acute distress. Appearance: She is not ill-appearing. HENT:      Nose: No congestion or rhinorrhea. Mouth/Throat:      Pharynx: No oropharyngeal exudate or posterior oropharyngeal erythema. Pulmonary:      Effort: Pulmonary effort is normal. No respiratory distress. Breath sounds: No wheezing or rhonchi. Skin:     Findings: Lesion (4-5 erythematous lesion on rt leg - non specidfic- no tenderness- local subdutaneous firmness ) present. MDM    Procedures        ICD-10-CM ICD-9-CM    1. Viral URI with cough  J06.9 465.9      Medications Ordered Today   Medications    benzonatate (TESSALON) 200 mg capsule     Sig: Take 1 Capsule by mouth three (3) times daily as needed for Cough for up to 7 days. Dispense:  21 Capsule     Refill:  0     No results found for any visits on 02/27/22. The patients condition was discussed with the patient and they understand. The patient is to follow up with primary care doctor.   If signs and symptoms become worse the pt is to go to the ER. The patient is to take medications as prescribed.

## 2022-02-27 NOTE — PATIENT INSTRUCTIONS
Upper Respiratory Infection (Cold): Care Instructions  Your Care Instructions     An upper respiratory infection, or URI, is an infection of the nose, sinuses, or throat. URIs are spread by coughs, sneezes, and direct contact. The common cold is the most frequent kind of URI. The flu and sinus infections are other kinds of URIs. Almost all URIs are caused by viruses. Antibiotics won't cure them. But you can treat most infections with home care. This may include drinking lots of fluids and taking over-the-counter pain medicine. You will probably feel better in 4 to 10 days. The doctor has checked you carefully, but problems can develop later. If you notice any problems or new symptoms, get medical treatment right away. Follow-up care is a key part of your treatment and safety. Be sure to make and go to all appointments, and call your doctor if you are having problems. It's also a good idea to know your test results and keep a list of the medicines you take. How can you care for yourself at home? · To prevent dehydration, drink plenty of fluids. Choose water and other clear liquids until you feel better. If you have kidney, heart, or liver disease and have to limit fluids, talk with your doctor before you increase the amount of fluids you drink. · Take an over-the-counter pain medicine, such as acetaminophen (Tylenol), ibuprofen (Advil, Motrin), or naproxen (Aleve). Read and follow all instructions on the label. · Before you use cough and cold medicines, check the label. These medicines may not be safe for young children or for people with certain health problems. · Be careful when taking over-the-counter cold or flu medicines and Tylenol at the same time. Many of these medicines have acetaminophen, which is Tylenol. Read the labels to make sure that you are not taking more than the recommended dose. Too much acetaminophen (Tylenol) can be harmful.   · Get plenty of rest.  · Do not smoke or allow others to smoke around you. If you need help quitting, talk to your doctor about stop-smoking programs and medicines. These can increase your chances of quitting for good. When should you call for help? Call 911 anytime you think you may need emergency care. For example, call if:    · You have severe trouble breathing. Call your doctor now or seek immediate medical care if:    · You seem to be getting much sicker.     · You have new or worse trouble breathing.     · You have a new or higher fever.     · You have a new rash. Watch closely for changes in your health, and be sure to contact your doctor if:    · You have a new symptom, such as a sore throat, an earache, or sinus pain.     · You cough more deeply or more often, especially if you notice more mucus or a change in the color of your mucus.     · You do not get better as expected. Where can you learn more? Go to http://www.gray.com/  Enter K520 in the search box to learn more about \"Upper Respiratory Infection (Cold): Care Instructions. \"  Current as of: July 6, 2021               Content Version: 13.0  © 2006-2021 [employer]. Care instructions adapted under license by [employer] (which disclaims liability or warranty for this information). If you have questions about a medical condition or this instruction, always ask your healthcare professional. [address] any warranty or liability for your use of this information.

## 2022-03-19 PROBLEM — E66.01 SEVERE OBESITY (HCC): Status: ACTIVE | Noted: 2019-04-24

## 2022-05-31 ENCOUNTER — HOSPITAL ENCOUNTER (EMERGENCY)
Age: 47
Discharge: HOME | End: 2022-05-31
Attending: EMERGENCY MEDICINE
Payer: MEDICAID

## 2022-05-31 VITALS
BODY MASS INDEX: 43.94 KG/M2 | OXYGEN SATURATION: 98 % | HEART RATE: 95 BPM | HEIGHT: 62 IN | RESPIRATION RATE: 16 BRPM | SYSTOLIC BLOOD PRESSURE: 138 MMHG | DIASTOLIC BLOOD PRESSURE: 79 MMHG | WEIGHT: 238.76 LBS | TEMPERATURE: 98.1 F

## 2022-05-31 DIAGNOSIS — R21 RASH: Primary | ICD-10-CM

## 2022-05-31 PROCEDURE — 99283 EMERGENCY DEPT VISIT LOW MDM: CPT

## 2022-05-31 RX ORDER — PREDNISONE 50 MG/1
50 TABLET ORAL DAILY
Qty: 5 TABLET | Refills: 0 | Status: SHIPPED | OUTPATIENT
Start: 2022-05-31 | End: 2022-06-05

## 2022-05-31 NOTE — ED PROVIDER NOTES
EMERGENCY DEPARTMENT HISTORY AND PHYSICAL EXAM      Date: 5/31/2022  Patient Name: Seth Painting  Patient Age and Sex: 51 y.o. female     History of Presenting Illness     Chief Complaint   Patient presents with    Skin Problem     right lower leg has flat, red, round spots on calf, shin and now foot, first appeared about 2 months ago       History Provided By: Patient    HPI: Seth Painting is a 58-year-old female presenting with rash. Patient states that 3 months ago she noticed 3 round circular rash spots on her right shin/calf. It has not gone away. Then overnight and another spot developed on her shin and another 1 on her foot. Denies any pain to the area, fevers, nausea, vomiting any history of autoimmune diseases. Has not seen anybody for this. Denies any trauma or any allergic reactions or contacts to any poison ivy. There are no other complaints, changes, or physical findings at this time. PCP: Brandon Ledesma MD    No current facility-administered medications on file prior to encounter. Current Outpatient Medications on File Prior to Encounter   Medication Sig Dispense Refill    omeprazole (PRILOSEC) 40 mg capsule Take 40 mg by mouth two (2) times a day.  sucralfate (Carafate) 1 gram tablet Take 1 g by mouth four (4) times daily.  ferrous sulfate (Iron) 325 mg (65 mg iron) tablet Take  by mouth Daily (before breakfast).  hydroCHLOROthiazide (HYDRODIURIL) 12.5 mg tablet Take 12.5 mg by mouth daily.  albuterol (ACCUNEB) 1.25 mg/3 mL nebu Take 3 mL by inhalation every four (4) hours as needed for Wheezing (wheezing). 25 Each 0    insulin glargine (Lantus Solostar U-100 Insulin) 100 unit/mL (3 mL) inpn 40 Units by SubCUTAneous route nightly.  multivitamin (ONE A DAY) tablet Take 1 Tablet by mouth daily.  ALPRAZolam (XANAX) 1 mg tablet Take 1 mg by mouth two (2) times daily as needed.       diphenhydrAMINE (ALLERGY RELIEF,DIPHENHYDRAMIN,) 25 mg capsule Take 25 mg by mouth nightly.  venlafaxine-SR (EFFEXOR XR) 75 mg capsule Take 75 mg by mouth daily.  INTRAUTERINE DEVICE, IUD, IU by IntraUTERine route.          Past History     Past Medical History:  Past Medical History:   Diagnosis Date    Arthritis     Cholesteatoma     Chronic pain     arthritis    COVID-19 2021    Depression     DM type 2 (diabetes mellitus, type 2) ([facility] 2009    GERD (gastroesophageal reflux disease)     mostly before gastric bypass/has again now    H/O gastric bypass     Ill-defined condition     anastomatic stricture    Morbid obesity (HCC)     Nausea & vomiting     with anesthesia    Non-nicotine vapor product user     Panic attacks     PUD (peptic ulcer disease)        Past Surgical History:  Past Surgical History:   Procedure Laterality Date    HX CARPAL TUNNEL RELEASE      right    HX CHOLECYSTECTOMY      HX GASTRIC BYPASS      2016    HX GASTRIC BYPASS  2021    revision, stricture resolved and pouch fistula reapaired    HX GASTRIC BYPASS  2021    revision    HX HEENT      3 surgeries - hole in eardrum x 2 - 2 surgeries to repair this/third surgery to removed cholesteatoma    HX ORTHOPAEDIC      plantar fasciitis - right    HX OTHER SURGICAL      EGD - ulcer/dilatation    HX TONSILLECTOMY      MULTIPLE DELIVERY       x3       Family History:  Family History   Problem Relation Age of Onset    Depression Mother     Other Mother         osteopenia/polycythemia vera/fibormyalgia    Depression Sister     Cancer Other         non-lymphocytic leukemia    Heart Disease Paternal Uncle     Alzheimer's Disease Maternal Grandmother     Alzheimer's Disease Paternal Grandmother     Dementia Maternal Aunt        Social History:  Social History     Tobacco Use    Smoking status: Current Every Day Smoker     Packs/day: 0.50    Smokeless tobacco: Current User   Substance Use Topics    Alcohol use: Not Currently    Drug use: No       Allergies: Allergies   Allergen Reactions    Allegra [Fexofenadine] Hives         Review of Systems   Review of Systems   Constitutional: Negative for chills and fever. Respiratory: Negative for cough and shortness of breath. Cardiovascular: Negative for chest pain. Gastrointestinal: Negative for abdominal pain, constipation, diarrhea, nausea and vomiting. Genitourinary: Negative for dysuria, frequency and hematuria. Skin: Positive for rash. Neurological: Negative for weakness and numbness. All other systems reviewed and are negative. Physical Exam   Physical Exam  Constitutional:       General: She is not in acute distress. Appearance: She is well-developed. HENT:      Head: Normocephalic and atraumatic. Nose: Nose normal.      Mouth/Throat:      Mouth: Mucous membranes are moist.   Eyes:      Extraocular Movements: Extraocular movements intact. Conjunctiva/sclera: Conjunctivae normal.   Cardiovascular:      Comments: Well perfused  Pulmonary:      Effort: Pulmonary effort is normal. No respiratory distress. Musculoskeletal:         General: Normal range of motion. Cervical back: Normal range of motion. Skin:     Comments: Over patient's right leg, over the medial calf there are 3 round circular erythematous patches that are blanching very well. Then over the shin there is an elongated patch that also blanches very well which is flat and nontender. Over her foot a small circular patch that again blanches and is nontender. Neurological:      General: No focal deficit present. Mental Status: She is alert and oriented to person, place, and time. Psychiatric:         Mood and Affect: Mood normal.          Diagnostic Study Results     Labs -   No results found for this or any previous visit (from the past 12 hour(s)).     Radiologic Studies -   No orders to display     CT Results  (Last 48 hours)    None        CXR Results  (Last 48 hours)    None            Medical Decision Making   I am the first provider for this patient. I reviewed the vital signs, available nursing notes, past medical history, past surgical history, family history and social history. Vital Signs-Reviewed the patient's vital signs. Patient Vitals for the past 12 hrs:   Temp Pulse Resp BP SpO2   05/31/22 1241 98.1 °F (36.7 °C) 95 16 138/79 98 %       Records Reviewed: Nursing Notes and Old Medical Records    Provider Notes (Medical Decision Making):   Patient presenting with these flat patchy red rashes that is blanching. Normal vitals here. Nontoxic-appearing. Could be a vasculitis. Does not appear to be allergic or contact dermatitis. Plan will be to prescribe her high-dose steroids for the next couple days and she will need to see a dermatologist if this continues. ED Course:   Initial assessment performed. The patients presenting problems have been discussed, and they are in agreement with the care plan formulated and outlined with them. I have encouraged them to ask questions as they arise throughout their visit. Critical Care Time:   0    Disposition:  Discharge Note:  The patient has been re-evaluated and is ready for discharge. Reviewed available results with patient. Counseled patient on diagnosis and care plan. Patient has expressed understanding, and all questions have been answered. Patient agrees with plan and agrees to follow up as recommended, or to return to the ED if their symptoms worsen. Discharge instructions have been provided and explained to the patient, along with reasons to return to the ED. PLAN:  Current Discharge Medication List      START taking these medications    Details   predniSONE (DELTASONE) 50 mg tablet Take 1 Tablet by mouth daily for 5 days.   Qty: 5 Tablet, Refills: 0  Start date: 5/31/2022, End date: 6/5/2022         1.   2.   Follow-up Information     Follow up With Specialties Details Why Contact Info [facility]  Schedule an appointment as soon as possible for a visit   [address] [address] [phone]        3. Return to ED if worse     Diagnosis     Clinical Impression:   1. Rash        Attestations:    Maddison Vasquez M.D. Please note that this dictation was completed with TMAT, the computer voice recognition software. Quite often unanticipated grammatical, syntax, homophones, and other interpretive errors are inadvertently transcribed by the computer software. Please disregard these errors. Please excuse any errors that have escaped final proofreading. Thank you.

## 2022-11-30 ENCOUNTER — OFFICE VISIT (OUTPATIENT)
Dept: FAMILY MEDICINE CLINIC | Age: 47
End: 2022-11-30
Payer: MEDICAID

## 2022-11-30 VITALS
OXYGEN SATURATION: 97 % | HEART RATE: 98 BPM | DIASTOLIC BLOOD PRESSURE: 79 MMHG | WEIGHT: 249.4 LBS | SYSTOLIC BLOOD PRESSURE: 123 MMHG | RESPIRATION RATE: 20 BRPM | HEIGHT: 60 IN | BODY MASS INDEX: 48.97 KG/M2 | TEMPERATURE: 98.3 F

## 2022-11-30 DIAGNOSIS — Z00.00 ENCOUNTER FOR MEDICAL EXAMINATION TO ESTABLISH CARE: Primary | ICD-10-CM

## 2022-11-30 DIAGNOSIS — E11.69 TYPE 2 DIABETES MELLITUS WITH OTHER SPECIFIED COMPLICATION, WITH LONG-TERM CURRENT USE OF INSULIN (HCC): ICD-10-CM

## 2022-11-30 DIAGNOSIS — F41.9 ANXIETY: ICD-10-CM

## 2022-11-30 DIAGNOSIS — F33.9 RECURRENT MAJOR DEPRESSIVE DISORDER, REMISSION STATUS UNSPECIFIED (HCC): ICD-10-CM

## 2022-11-30 DIAGNOSIS — L30.9 DERMATITIS: ICD-10-CM

## 2022-11-30 DIAGNOSIS — Z79.4 TYPE 2 DIABETES MELLITUS WITH OTHER SPECIFIED COMPLICATION, WITH LONG-TERM CURRENT USE OF INSULIN (HCC): ICD-10-CM

## 2022-11-30 DIAGNOSIS — Z23 NEEDS FLU SHOT: ICD-10-CM

## 2022-11-30 PROCEDURE — 90686 IIV4 VACC NO PRSV 0.5 ML IM: CPT | Performed by: NURSE PRACTITIONER

## 2022-11-30 PROCEDURE — 3052F HG A1C>EQUAL 8.0%<EQUAL 9.0%: CPT | Performed by: NURSE PRACTITIONER

## 2022-11-30 PROCEDURE — 99204 OFFICE O/P NEW MOD 45 MIN: CPT | Performed by: NURSE PRACTITIONER

## 2022-11-30 RX ORDER — LANCETS 28 GAUGE
EACH MISCELLANEOUS
Qty: 300 LANCET | Refills: 3 | Status: SHIPPED | OUTPATIENT
Start: 2022-11-30

## 2022-11-30 RX ORDER — GLIMEPIRIDE 4 MG/1
TABLET ORAL
COMMUNITY
Start: 2022-11-26

## 2022-11-30 RX ORDER — VENLAFAXINE HYDROCHLORIDE 150 MG/1
150 CAPSULE, EXTENDED RELEASE ORAL DAILY
Qty: 90 CAPSULE | Refills: 1 | Status: SHIPPED | OUTPATIENT
Start: 2022-11-30

## 2022-11-30 RX ORDER — CALCIUM CITRATE/VITAMIN D3 200MG-6.25
TABLET ORAL
Qty: 300 STRIP | Refills: 3 | Status: SHIPPED | OUTPATIENT
Start: 2022-11-30

## 2022-11-30 NOTE — PROGRESS NOTES
Chief Complaint   Patient presents with    Establish [facility]         HPI:  Nicolasa is a 49y.o. year old female who is a new patient and is here to establish care. She was previous followed by Len Schwartz at [facility], retiring next month. [facility]      DM  Last HgbA1c was elevated at last check in October  Added amaryl 4mg at that time. She has been taking lantus 40 units QHS. Blood sugars not been able to check, needs test strips. No symptoms of hypoglycemia     History of Right ear surgery for cholesteatoma and ruptured TM. Has hearing loss on right ear. Has been followed by [facility], Dr Woody Velez. Has not seen in a while, ear has been stable. Needs dermatology for right leg rash for the past 10 months or so. Occasionally itches, tender to push on it. Says she has an appointment next month but cannot recall whom she is suppose to see. Anxiety/depression/panic attack  On effexor for the past three years. Taking 75 mg per day, most of the time works okay to control her symptoms. Has xanax prescription 1mg that she takes BID. Says she has been on xanax for 22 years. Says she cannot sleep without taking a pill at night. Denies SI/HI        The following sections were reviewed & updated as appropriate: PMH, PL, PSH, and SH. Health Maintenance:     Patient Active Problem List   Diagnosis Code    Anxiety F41.9    Depression F32. A    DM type 2 (diabetes mellitus, type 2) ([facility]) E11.9    Stress headaches F45.41    Urge incontinence of urine N39.41    Heel spur M77.30    Severe obesity ([facility]) E66.01          Prior to Admission medications    Medication Sig Start Date End Date Taking? Authorizing Provider   glimepiride (AMARYL) 4 mg tablet  11/26/22  Yes Provider, Historical   omeprazole (PRILOSEC) 40 mg capsule Take 40 mg by mouth two (2) times a day. Yes Provider, Historical   sucralfate (CARAFATE) 1 gram tablet Take 1 g by mouth four (4) times daily.    Yes Provider, Historical   hydroCHLOROthiazide (HYDRODIURIL) 12.5 mg tablet Take 12.5 mg by mouth daily. Yes Provider, Historical   albuterol (ACCUNEB) 1.25 mg/3 mL nebu Take 3 mL by inhalation every four (4) hours as needed for Wheezing (wheezing). 6/27/21  Yes Rossana Stevens PA   insulin glargine (Lantus Solostar U-100 Insulin) 100 unit/mL (3 mL) inpn 40 Units by SubCUTAneous route nightly. 1/15/21  Yes Provider, Historical   multivitamin (ONE A DAY) tablet Take 1 Tablet by mouth daily. Yes Provider, Historical   ALPRAZolam (XANAX) 1 mg tablet Take 1 mg by mouth two (2) times daily as needed. Yes Provider, Historical   diphenhydrAMINE (BENADRYL) 25 mg capsule Take 25 mg by mouth nightly. Yes Provider, Historical   venlafaxine-SR (EFFEXOR-XR) 75 mg capsule Take 75 mg by mouth daily. Yes Provider, Historical   INTRAUTERINE DEVICE, IUD, IU by IntraUTERine route. Yes Provider, Historical   ferrous sulfate 325 mg (65 mg iron) tablet Take  by mouth Daily (before breakfast).   Patient not taking: Reported on 11/30/2022    Provider, Historical          Allergies   Allergen Reactions    Allegra [Fexofenadine] Hives            ROS:  Gen: no fatigue, fever, chills  Eyes: no excessive tearing, itching, or discharge  Nose: no rhinorrhea, no sinus pain  Mouth: no oral lesions, no sore throat  Resp: no shortness of breath, no wheezing, no cough  CV: no chest pain, no paroxysmal nocturnal dyspnea  Abd: no nausea, no heartburn, no diarrhea, no constipation, no abdominal pain  Neuro: no headaches, no syncope or presyncopal episodes  Endo: no polyuria, no polydipsia  Heme: no lymphadenopathy, no easy bruising or bleeding      Objective:    Visit Vitals  /79 (BP 1 Location: Left upper arm, BP Patient Position: Sitting, BP Cuff Size: Large adult)   Pulse 98   Temp 98.3 °F (36.8 °C) (Temporal)   Resp 20   Ht 5' (1.524 m)   Wt 249 lb 6.4 oz (113.1 kg)   SpO2 97%   BMI 48.71 kg/m²     Gen: alert, oriented, no acute distress  Head: normocephalic, atraumatic  Ears: external auditory canals clear, TMs without erythema or effusion  Eyes: pupils equal round reactive to light, sclera clear, conjunctiva clear  Nose: normal turbinates, no rhinorrhea  Oral: moist mucus membranes, no oral lesions, no pharyngeal inflammation or exudate  Neck: supple, no lymphadenopathy  Resp: no increased work of breathing, lungs clear to ausculation bilaterally  CV: S1, S2 normal, no murmurs, rubs, or gallops. Abd: soft, not tender, not distended. No hepatosplenomegaly. Normal bowel sounds. No hernias. Neuro: cranial nerves intact, normal strength and movement in all extremities, and sensation intact and symmetric. Skin: right lower leg with some scattered purplish discoloration areas of skin that benja, no pain or signs of infection, flat and normal temperature         Assessment & Plan:  Differential diagnosis and treatment options reviewed with patient who is in agreement with treatment plan as outlined below. ICD-10-CM ICD-9-CM    1. Encounter for medical examination to establish care  Z00.00 V70.9       2. Needs flu shot  Z23 V04.81 INFLUENZA, FLUARIX, FLULAVAL, FLUZONE (AGE 6 MO+), AFLURIA(AGE 3Y+) IM, PF, 0.5 ML      3. Type 2 diabetes mellitus with other specified complication, with long-term current use of insulin ([facility])  E11.69 250.80 glucose blood VI test strips (True Metrix Glucose Test Strip) strip    Z79.4 V58.67 lancets (TRUEplus Lancets) 28 gauge misc      4. Anxiety  F41.9 300.00 venlafaxine-SR (EFFEXOR-XR) 150 mg capsule      COMPLIANCE DRUG SCREEN/PRESCRIPTION MONITORING      COMPLIANCE DRUG SCREEN/PRESCRIPTION MONITORING      5. Recurrent major depressive disorder, remission status unspecified ([facility])  F33.9 296.30 venlafaxine-SR (EFFEXOR-XR) 150 mg capsule      COMPLIANCE DRUG SCREEN/PRESCRIPTION MONITORING      COMPLIANCE DRUG SCREEN/PRESCRIPTION MONITORING      6.  Dermatitis  L30.9 692.9           BP at goal.  Labs to be done in January. Will request old records and labs. Increase effexor to 150 mg daily. Long discussion about dangers associated with chronic benzo use. Will have her try to wean down on xanax dosing, 1/2 pill in AM initially and then hopefully only PRN in daytime or consider adding buspar if needed. Hope that increase effexor dose will help control her daytime anxiety. Will get UDS today  Controlled substance agreement signed. I have reviewed the patient's controlled substance prescription history thru the Prescription Monitoring Program, so that the prescription(s) for a controlled substance can be given. Benzodiazepines: Potential side effects of benzodiazepine medications include, but are not limited to, the possibility of \"paradoxical agitation\" with irritability, aggressiveness or stimulated behavior; clumsiness, slurring of speech, dulled facies, psychomotor impairment, anterograde amnesia, impaired awareness of degree of drug effect, visual and hearing sensitivity impairment, other psychiatric/behavioral disturbances, impacts operating certain machinery or engaging in certain activities or employment, anxiety, insomnia, anorexia, tremor, nausea, vomiting, diarrhea and potential to develop tolerance, dependence, addiction and death from overdose. Discussed BMI and healthy weight. Encouraged patient to work to implement changes including diet high in raw fruits and vegetables, lean protein and good fats. Limit refined, processed carbohydrates and sugar. Encouraged regular exercise. Requesting old records  Will let me know who she will see for dermatology    Flu shot today. VIS discussed and copy given in AVS      Verbal and written instructions (see AVS) provided. Patient expresses understanding and agreement of diagnosis and treatment plan.

## 2022-11-30 NOTE — PROGRESS NOTES
Chief Complaint   Patient presents with    Establish Care     1. Have you been to the ER, urgent care clinic since your last visit? Hospitalized since your last visit? No    2. Have you seen or consulted any other health care providers outside of the [address] [place] [address] since your last visit? Include any pap smears or colon screening.  ENT, [facility], Recent eye exan    Health Maintenance Due   Topic Date Due    Hepatitis C Screening  Never done    Pneumococcal 0-64 years (1 - PCV) Never done    Foot Exam Q1  Never done    Eye Exam Retinal or Dilated  Never done    Depression Monitoring  Never done    Hepatitis B Vaccine (1 of 3 - Risk 3-dose series) Never done    DTaP/Tdap/Td series (1 - Tdap) Never done    MICROALBUMIN Q1  10/19/2011    Lipid Screen  08/04/2012    Colorectal Cancer Screening Combo  Never done    COVID-19 Vaccine (3 - Booster for Moderna series) 07/02/2021    Flu Vaccine (1) 08/01/2022

## 2023-01-30 ENCOUNTER — OFFICE VISIT (OUTPATIENT)
Dept: FAMILY MEDICINE CLINIC | Age: 48
End: 2023-01-30
Payer: MEDICAID

## 2023-01-30 VITALS
WEIGHT: 257 LBS | SYSTOLIC BLOOD PRESSURE: 117 MMHG | RESPIRATION RATE: 18 BRPM | HEIGHT: 61 IN | BODY MASS INDEX: 48.52 KG/M2 | HEART RATE: 101 BPM | OXYGEN SATURATION: 98 % | DIASTOLIC BLOOD PRESSURE: 79 MMHG | TEMPERATURE: 98.2 F

## 2023-01-30 DIAGNOSIS — R41.840 LACK OF CONCENTRATION: ICD-10-CM

## 2023-01-30 DIAGNOSIS — E11.69 TYPE 2 DIABETES MELLITUS WITH OTHER SPECIFIED COMPLICATION, WITH LONG-TERM CURRENT USE OF INSULIN (HCC): Primary | ICD-10-CM

## 2023-01-30 DIAGNOSIS — Z98.84 HISTORY OF GASTRIC BYPASS: ICD-10-CM

## 2023-01-30 DIAGNOSIS — F41.9 ANXIETY: ICD-10-CM

## 2023-01-30 DIAGNOSIS — Z11.59 ENCOUNTER FOR HEPATITIS C SCREENING TEST FOR LOW RISK PATIENT: ICD-10-CM

## 2023-01-30 DIAGNOSIS — E53.8 B12 DEFICIENCY: ICD-10-CM

## 2023-01-30 DIAGNOSIS — Z13.29 SCREENING FOR THYROID DISORDER: ICD-10-CM

## 2023-01-30 DIAGNOSIS — Z79.4 TYPE 2 DIABETES MELLITUS WITH OTHER SPECIFIED COMPLICATION, WITH LONG-TERM CURRENT USE OF INSULIN (HCC): Primary | ICD-10-CM

## 2023-01-30 DIAGNOSIS — F32.89 OTHER DEPRESSION: ICD-10-CM

## 2023-01-30 DIAGNOSIS — Z13.220 SCREENING, LIPID: ICD-10-CM

## 2023-01-30 DIAGNOSIS — D50.9 IRON DEFICIENCY ANEMIA, UNSPECIFIED IRON DEFICIENCY ANEMIA TYPE: ICD-10-CM

## 2023-01-30 DIAGNOSIS — E55.9 VITAMIN D DEFICIENCY: ICD-10-CM

## 2023-01-30 PROCEDURE — 99214 OFFICE O/P EST MOD 30 MIN: CPT | Performed by: NURSE PRACTITIONER

## 2023-01-30 RX ORDER — VENLAFAXINE 100 MG/1
100 TABLET ORAL 2 TIMES DAILY
Qty: 60 TABLET | Refills: 2 | Status: SHIPPED | OUTPATIENT
Start: 2023-01-30

## 2023-01-30 NOTE — PROGRESS NOTES
Subjective:     Chief Complaint   Patient presents with    Follow-up     2 month re-check         HPI:  49 y.o.  presents for follow up appointment. Increased effexor dose. Not improving. Mood and motivation worsening and feeling more isolated . Not sleeping well, never sleeps more than 6 hours  Xanax- trying to wean down but not sleeping as well, trying to take once per day during the week but taking every night. Says she used to be on medicine for ADHD in the past but does not recall any formal ADHD testing. Notes that she needs FMLA paper filled out because she has been missing work secondary to her symptoms, lack of focus or concentration     Tried trazodone in the past but made her stay awake all night instead of helping her sleep. Has been on prozac in the past, did not help. Was actually hospitalized into psychiatric care when on prozac. Had been on paxil in the past as well, does not recall being on that in the past.       Blood sugars-120s in AM   Drinking plenty of water. Notes that she increased lantus dose to 60 units about 2 weeks ago because her blood sugars were increasing, now fasting blood sugars better. Saw derm for rash-diagnosed with granuloma annulare and she had steroid injections in her leg and rash is getting better. No hospital, ER or specialist visits since last primary care visit except as noted above.     Past Medical History:   Diagnosis Date    Arthritis     Cholesteatoma     Chronic pain     arthritis    COVID-19 04/14/2021    Depression     DM type 2 (diabetes mellitus, type 2) ([facility] [address] 6/12/2009    GERD (gastroesophageal reflux disease)     mostly before gastric bypass/has again now    H/O gastric bypass     Ill-defined condition     anastomatic stricture    Morbid obesity (HCC)     Nausea & vomiting     with anesthesia    Non-nicotine vapor product user     Panic attacks     PUD (peptic ulcer disease)        Social History     Tobacco Use    Smoking status: Every Day     Packs/day: 0.50     Types: Cigarettes    Smokeless tobacco: Current   Substance Use Topics    Alcohol use: Not Currently    Drug use: No       Outpatient Medications Marked as Taking for the 1/30/23 encounter (Office Visit) with Cathryn Bass NP   Medication Sig Dispense Refill    glimepiride (AMARYL) 4 mg tablet       venlafaxine-SR (EFFEXOR-XR) 150 mg capsule Take 1 Capsule by mouth daily. 90 Capsule 1    glucose blood VI test strips (True Metrix Glucose Test Strip) strip Test blood sugars three times per day 300 Strip 3    lancets (TRUEplus Lancets) 28 gauge misc Check blood sugar three times per day 300 Lancet 3    omeprazole (PRILOSEC) 40 mg capsule Take 40 mg by mouth two (2) times a day. sucralfate (CARAFATE) 1 gram tablet Take 1 g by mouth four (4) times daily. hydroCHLOROthiazide (HYDRODIURIL) 12.5 mg tablet Take 12.5 mg by mouth daily. albuterol (ACCUNEB) 1.25 mg/3 mL nebu Take 3 mL by inhalation every four (4) hours as needed for Wheezing (wheezing). 25 Each 0    insulin glargine (Lantus Solostar U-100 Insulin) 100 unit/mL (3 mL) inpn 40 Units by SubCUTAneous route nightly. multivitamin (ONE A DAY) tablet Take 1 Tablet by mouth daily. ALPRAZolam (XANAX) 1 mg tablet Take 1 mg by mouth two (2) times daily as needed. diphenhydrAMINE (BENADRYL) 25 mg capsule Take 25 mg by mouth nightly. INTRAUTERINE DEVICE, IUD, IU by IntraUTERine route.          Allergies   Allergen Reactions    Allegra [Fexofenadine] Hives       Health Maintenance reviewed      ROS:  Gen: + fatigue, no fever, no chills, no unexplained weight loss or weight gain  Eyes: no excessive tearing, itching, or discharge  Nose: no rhinorrhea, no sinus pain  Mouth: no oral lesions, no sore throat, no difficulty swallowing  Resp: no shortness of breath, no wheezing, no cough  CV: no chest pain, no orthopnea, no paroxysmal nocturnal dyspnea, no worsening lower extremity edema, no palpitations  Abd: no nausea, no heartburn, no diarrhea, no constipation, no abdominal pain  Neuro: no headaches, no syncope or presyncopal episodes  Endo: no polyuria, no polydipsia. : no hematuria, no dysuria, no frequency, no incontinence  Heme: no lymphadenopathy, no easy bruising or bleeding, no night sweats      PE:  Visit Vitals  /79 (BP 1 Location: Right upper arm, BP Patient Position: Sitting, BP Cuff Size: Large adult)   Pulse (!) 101   Temp 98.2 °F (36.8 °C) (Temporal)   Resp 18   Ht 5' 1\" (1.549 m)   Wt 257 lb (116.6 kg)   SpO2 98%   BMI 48.56 kg/m²     Gen: alert, oriented, no acute distress  Head: normocephalic, atraumatic  Eyes: pupils equal round reactive to light, sclera clear, conjunctiva clear  Oral: moist mucus membranes, no oral lesions, no pharyngeal inflammation or exudate  Neck: symmetric normal sized thyroid, no carotid bruits, no jugular vein distention  Resp: no increase work of breathing, lungs clear to ausculation bilaterally, no wheezing, rales or rhonchi  CV: S1, S2 normal.  No murmurs, rubs, or gallops. Abd: soft, not tender, not distended. No hepatosplenomegaly. Normal bowel sounds. No hernias. No abdominal or renal bruits. Neuro: cranial nerves intact, normal strength and movement in all extremities, and sensation intact and symmetric. Skin:hyperpigmentation rash to right lower extremity improving. Extremities: no cyanosis. +chronic +1 bilateral lower extremity edema    No results found for this visit on 01/30/23. Assessment/Plan:  Differential diagnosis and treatment options reviewed with patient who is in agreement with treatment plan as outlined below. ICD-10-CM ICD-9-CM    1. Type 2 diabetes mellitus with other specified complication, with long-term current use of insulin ([facility])  G88.23 474.15 METABOLIC PANEL, COMPREHENSIVE    Z79.4 V58.67 CBC WITH AUTOMATED DIFF      METABOLIC PANEL, COMPREHENSIVE      CBC WITH AUTOMATED DIFF      2.  B12 deficiency E53.8 266.2 VITAMIN B12      VITAMIN B12      3. Vitamin D deficiency  E55.9 268.9 VITAMIN D, 25 HYDROXY      VITAMIN D, 25 HYDROXY      4. Anxiety  F41.9 300.00 HEMOGLOBIN A1C WITH EAG      HEMOGLOBIN A1C WITH EAG      venlafaxine (EFFEXOR) 100 mg tablet      REFERRAL TO NEUROPSYCHOLOGY      5. Screening, lipid  Z13.220 V77.91 LIPID PANEL      LIPID PANEL      6. Screening for thyroid disorder  Z13.29 V77.0 TSH 3RD GENERATION      TSH 3RD GENERATION      7. Iron deficiency anemia, unspecified iron deficiency anemia type  D50.9 280.9 IRON PROFILE      IRON PROFILE      8. Encounter for hepatitis C screening test for low risk patient  Z11.59 V73.89 HEPATITIS C AB      HEPATITIS C AB      9. Other depression  F32.89 311 venlafaxine (EFFEXOR) 100 mg tablet      10. Lack of concentration  R41.840 799.51 REFERRAL TO NEUROPSYCHOLOGY      11. History of gastric bypass  Z98.84 V45.86         DM- labs today. Controlled per patient. Routine labs today    Referral to neuropsychology for evaluation. Patient claims to have history of ADD but no formal testing. She notes that her lack of concentration and focus has   For now, will decrease Effexor to 100 mg BID regular release to see if she does better on this given her history of GB and being on extended release may not absorb effectively. Follow up one month or sooner if needed  Consider buspar for anxiety as well. Discussed BMI and healthy weight. Encouraged patient to work to implement changes including diet high in raw fruits and vegetables, lean protein and good fats. Limit refined, processed carbohydrates and sugar. Encouraged regular exercise. Recommended regular cardiovascular exercise 3-6 times per week for 30-60 minutes daily. I have discussed the diagnosis with the patient and the intended plan as seen in the above orders. The patient has received an after-visit summary and questions were answered concerning future plans.   I have discussed medication side effects and warnings with the patient as well. The patient verbalizes understanding and agreement with the plan.

## 2023-01-30 NOTE — PROGRESS NOTES
Chief Complaint   Patient presents with    Follow-up     2 month re-check        1. Have you been to the ER, urgent care clinic since your last visit? Hospitalized since your last visit? NO    2. Have you seen or consulted any other health care providers outside of the [address] [place] [address] since your last visit? Include any pap smears or colon screening.   GI Dr, ENT, Orthopedic    Health Maintenance Due   Topic Date Due    Hepatitis C Screening  Never done    Pneumococcal 0-64 years (1 - PCV) Never done    Foot Exam Q1  Never done    Eye Exam Retinal or Dilated  Never done    DTaP/Tdap/Td series (1 - Tdap) Never done    Diabetic Alb to Cr ratio (uACR) test  10/19/2011    Lipid Screen  08/04/2012    GFR test (Diabetes, CKD 3-4, OR last GFR 15-59)  09/12/2017    Colorectal Cancer Screening Combo  Never done    COVID-19 Vaccine (3 - Booster for Moderna series) 07/02/2021    A1C test (Diabetic or Prediabetic)  01/10/2023

## 2023-02-04 LAB
25(OH)D3+25(OH)D2 SERPL-MCNC: 12.6 NG/ML (ref 30–100)
ALBUMIN SERPL-MCNC: 4.1 G/DL (ref 3.8–4.8)
ALBUMIN/GLOB SERPL: 1.4 {RATIO} (ref 1.2–2.2)
ALP SERPL-CCNC: 225 IU/L (ref 44–121)
ALT SERPL-CCNC: 33 IU/L (ref 0–32)
AST SERPL-CCNC: 20 IU/L (ref 0–40)
BASOPHILS # BLD AUTO: 0.1 X10E3/UL (ref 0–0.2)
BASOPHILS NFR BLD AUTO: 1 %
BILIRUB SERPL-MCNC: <0.2 MG/DL (ref 0–1.2)
BUN SERPL-MCNC: 7 MG/DL (ref 6–24)
BUN/CREAT SERPL: 9 (ref 9–23)
CALCIUM SERPL-MCNC: 9.4 MG/DL (ref 8.7–10.2)
CHLORIDE SERPL-SCNC: 99 MMOL/L (ref 96–106)
CHOLEST SERPL-MCNC: 171 MG/DL (ref 100–199)
CO2 SERPL-SCNC: 22 MMOL/L (ref 20–29)
CREAT SERPL-MCNC: 0.74 MG/DL (ref 0.57–1)
EGFRCR SERPLBLD CKD-EPI 2021: 100 ML/MIN/1.73
EOSINOPHIL # BLD AUTO: 0.1 X10E3/UL (ref 0–0.4)
EOSINOPHIL NFR BLD AUTO: 1 %
ERYTHROCYTE [DISTWIDTH] IN BLOOD BY AUTOMATED COUNT: 14.5 % (ref 11.7–15.4)
EST. AVERAGE GLUCOSE BLD GHB EST-MCNC: 249 MG/DL
GLOBULIN SER CALC-MCNC: 2.9 G/DL (ref 1.5–4.5)
GLUCOSE SERPL-MCNC: 219 MG/DL (ref 70–99)
HBA1C MFR BLD: 10.3 % (ref 4.8–5.6)
HCT VFR BLD AUTO: 45.4 % (ref 34–46.6)
HCV AB S/CO SERPL IA: <0.1 S/CO RATIO (ref 0–0.9)
HDLC SERPL-MCNC: 51 MG/DL
HGB BLD-MCNC: 14.9 G/DL (ref 11.1–15.9)
IMM GRANULOCYTES # BLD AUTO: 0.1 X10E3/UL (ref 0–0.1)
IMM GRANULOCYTES NFR BLD AUTO: 1 %
IMP & REVIEW OF LAB RESULTS: NORMAL
IRON SATN MFR SERPL: 8 % (ref 15–55)
IRON SERPL-MCNC: 37 UG/DL (ref 27–159)
LDLC SERPL CALC-MCNC: 98 MG/DL (ref 0–99)
LYMPHOCYTES # BLD AUTO: 2.9 X10E3/UL (ref 0.7–3.1)
LYMPHOCYTES NFR BLD AUTO: 26 %
MCH RBC QN AUTO: 29.2 PG (ref 26.6–33)
MCHC RBC AUTO-ENTMCNC: 32.8 G/DL (ref 31.5–35.7)
MCV RBC AUTO: 89 FL (ref 79–97)
MONOCYTES # BLD AUTO: 0.5 X10E3/UL (ref 0.1–0.9)
MONOCYTES NFR BLD AUTO: 4 %
NEUTROPHILS # BLD AUTO: 7.5 X10E3/UL (ref 1.4–7)
NEUTROPHILS NFR BLD AUTO: 67 %
PLATELET # BLD AUTO: 370 X10E3/UL (ref 150–450)
POTASSIUM SERPL-SCNC: 4.4 MMOL/L (ref 3.5–5.2)
PROT SERPL-MCNC: 7 G/DL (ref 6–8.5)
RBC # BLD AUTO: 5.11 X10E6/UL (ref 3.77–5.28)
SODIUM SERPL-SCNC: 139 MMOL/L (ref 134–144)
TIBC SERPL-MCNC: 439 UG/DL (ref 250–450)
TRIGL SERPL-MCNC: 124 MG/DL (ref 0–149)
TSH SERPL DL<=0.005 MIU/L-ACNC: 1.34 UIU/ML (ref 0.45–4.5)
UIBC SERPL-MCNC: 402 UG/DL (ref 131–425)
VLDLC SERPL CALC-MCNC: 22 MG/DL (ref 5–40)
WBC # BLD AUTO: 11.2 X10E3/UL (ref 3.4–10.8)

## 2023-02-06 DIAGNOSIS — D50.9 IRON DEFICIENCY ANEMIA, UNSPECIFIED IRON DEFICIENCY ANEMIA TYPE: ICD-10-CM

## 2023-02-06 DIAGNOSIS — Z79.4 TYPE 2 DIABETES MELLITUS WITH OTHER SPECIFIED COMPLICATION, WITH LONG-TERM CURRENT USE OF INSULIN (HCC): Primary | ICD-10-CM

## 2023-02-06 DIAGNOSIS — E11.69 TYPE 2 DIABETES MELLITUS WITH OTHER SPECIFIED COMPLICATION, WITH LONG-TERM CURRENT USE OF INSULIN (HCC): Primary | ICD-10-CM

## 2023-02-06 RX ORDER — INSULIN GLARGINE 100 [IU]/ML
60 INJECTION, SOLUTION SUBCUTANEOUS
Qty: 20 PEN | Refills: 1 | Status: SHIPPED | OUTPATIENT
Start: 2023-02-06

## 2023-02-06 RX ORDER — LANOLIN ALCOHOL/MO/W.PET/CERES
325 CREAM (GRAM) TOPICAL
Qty: 180 TABLET | Refills: 2 | Status: SHIPPED | OUTPATIENT
Start: 2023-02-06

## 2023-02-06 RX ORDER — GLIMEPIRIDE 4 MG/1
4 TABLET ORAL
Qty: 90 TABLET | Refills: 2 | Status: SHIPPED | OUTPATIENT
Start: 2023-02-06

## 2023-02-06 RX ORDER — ERGOCALCIFEROL 1.25 MG/1
50000 CAPSULE ORAL
Qty: 12 CAPSULE | Refills: 3 | Status: SHIPPED | OUTPATIENT
Start: 2023-02-06

## 2023-02-07 ENCOUNTER — TELEPHONE (OUTPATIENT)
Dept: FAMILY MEDICINE CLINIC | Age: 48
End: 2023-02-07

## 2023-02-07 NOTE — TELEPHONE ENCOUNTER
Pt's FMLA faxed and confirmation received. Called pt to let her know and she can pick copy up that is up front with the PSR's.

## 2023-02-08 ENCOUNTER — DOCUMENTATION ONLY (OUTPATIENT)
Dept: INTERNAL MEDICINE CLINIC | Age: 48
End: 2023-02-08

## 2023-02-08 NOTE — PROGRESS NOTES
[place] received for Insulin Glargine-yfgn 100UNIT/ML pen-injectors. Kameron Saab stated generic not covered,claim went thru for Lantus. No PA needed. Please note. Thanks

## 2023-02-14 ENCOUNTER — PATIENT MESSAGE (OUTPATIENT)
Dept: FAMILY MEDICINE CLINIC | Age: 48
End: 2023-02-14

## 2023-02-14 DIAGNOSIS — G47.00 INSOMNIA, UNSPECIFIED TYPE: Primary | ICD-10-CM

## 2023-02-15 NOTE — TELEPHONE ENCOUNTER
From: Hannah Fraire  To: Nina Krueger NP  Sent: 2/14/2023 7:17 AM EST  Subject: Sleep Medication    Since lowering the alprazolam I have been having a difficult time sleeping. I really need something that will help me sleep.

## 2023-02-22 RX ORDER — DOXEPIN HYDROCHLORIDE 10 MG/1
10 CAPSULE ORAL
Qty: 30 CAPSULE | Refills: 1 | Status: SHIPPED | OUTPATIENT
Start: 2023-02-22

## 2023-02-23 ENCOUNTER — VIRTUAL VISIT (OUTPATIENT)
Dept: NEUROLOGY | Age: 48
End: 2023-02-23
Payer: MEDICAID

## 2023-02-23 DIAGNOSIS — R45.89 SYMPTOMS OF DEPRESSION: ICD-10-CM

## 2023-02-23 DIAGNOSIS — R41.840 ATTENTION AND CONCENTRATION DEFICIT: Primary | ICD-10-CM

## 2023-02-23 DIAGNOSIS — R41.3 MEMORY LOSS: ICD-10-CM

## 2023-02-23 DIAGNOSIS — Z91.49 HISTORY OF PSYCHOLOGICAL TRAUMA: ICD-10-CM
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
## 2023-02-27 ENCOUNTER — OFFICE VISIT (OUTPATIENT)
Dept: FAMILY MEDICINE CLINIC | Age: 48
End: 2023-02-27
Payer: MEDICAID

## 2023-02-27 VITALS
OXYGEN SATURATION: 98 % | HEIGHT: 61 IN | WEIGHT: 260.2 LBS | DIASTOLIC BLOOD PRESSURE: 82 MMHG | RESPIRATION RATE: 16 BRPM | TEMPERATURE: 98.1 F | SYSTOLIC BLOOD PRESSURE: 138 MMHG | HEART RATE: 88 BPM | BODY MASS INDEX: 49.12 KG/M2

## 2023-02-27 DIAGNOSIS — G47.00 INSOMNIA, UNSPECIFIED TYPE: ICD-10-CM

## 2023-02-27 DIAGNOSIS — E11.69 TYPE 2 DIABETES MELLITUS WITH OTHER SPECIFIED COMPLICATION, WITH LONG-TERM CURRENT USE OF INSULIN (HCC): Primary | ICD-10-CM

## 2023-02-27 DIAGNOSIS — F41.9 ANXIETY: ICD-10-CM

## 2023-02-27 DIAGNOSIS — Z79.4 TYPE 2 DIABETES MELLITUS WITH OTHER SPECIFIED COMPLICATION, WITH LONG-TERM CURRENT USE OF INSULIN (HCC): Primary | ICD-10-CM

## 2023-02-27 PROCEDURE — 3046F HEMOGLOBIN A1C LEVEL >9.0%: CPT | Performed by: NURSE PRACTITIONER

## 2023-02-27 PROCEDURE — 99214 OFFICE O/P EST MOD 30 MIN: CPT | Performed by: NURSE PRACTITIONER

## 2023-02-27 RX ORDER — ALPRAZOLAM 1 MG/1
1 TABLET ORAL
Qty: 30 TABLET | Refills: 2 | Status: SHIPPED | OUTPATIENT
Start: 2023-02-27

## 2023-02-27 NOTE — PROGRESS NOTES
Chief Complaint   Patient presents with    Follow-up     4 week follow up     1. Have you been to the ER, urgent care clinic since your last visit? Hospitalized since your last visit? No    2. Have you seen or consulted any other health care providers outside of the [address] [place] [address] since your last visit? Include any pap smears or colon screening.   GI    Health Maintenance Due   Topic Date Due    Pneumococcal 0-64 years (1 - PCV) Never done    Foot Exam Q1  Never done    Eye Exam Retinal or Dilated  Never done    DTaP/Tdap/Td series (1 - Tdap) Never done    Diabetic Alb to Cr ratio (uACR) test  10/19/2011    Colorectal Cancer Screening Combo  Never done    COVID-19 Vaccine (3 - Booster for Moderna series) 07/02/2021

## 2023-02-27 NOTE — PROGRESS NOTES
Subjective:     Chief Complaint   Patient presents with    Follow-up     4 week follow up        HPI:  49 y.o.  presents for follow up appointment. At last visit, we decreased her effexor dose to 100 mg BID (regular release) due to history of GB to see if that would be more effective for her. She has not noted much difference but overall feeling pretty good. She has been able to cut back on xanax dose, taking about once per day instead of twice per day. She was also referred to neuropsych for ADD testing. She had initial consultation on 2/23/23  Has formal testing set up for 3/24/23    Recently started her on doxepin for insomnia, which she says is working well for her and helping sleep. Has GI follow up Friday. DM  HgbA1c was 10.3 on 1/30/23, started on actos then. She is watching her diet better and drinking water. Blood sugars doing better,  fasting. Discouraged with gaining weight, couple lbs over the past month. No hospital, ER or specialist visits since last primary care visit except as noted above.     Past Medical History:   Diagnosis Date    Arthritis     Cholesteatoma     Chronic pain     arthritis    COVID-19 04/14/2021    Depression     DM type 2 [facility] 6/12/2009    GERD (gastroesophageal reflux disease)     mostly before gastric bypass/has again now    H/O gastric bypass     Ill-defined condition     anastomatic stricture    Morbid obesity (HCC)     Nausea & vomiting     with anesthesia    Non-nicotine vapor product user     Panic attacks     PUD (peptic ulcer disease)        Social History     Tobacco Use    Smoking status: Every Day     Packs/day: 0.50     Types: Cigarettes    Smokeless tobacco: Current   Substance Use Topics    Alcohol use: Not Currently    Drug use: No       Outpatient Medications Marked as Taking for the 2/27/23 encounter (Office Visit) with Cathryn Bass NP   Medication Sig Dispense Refill    doxepin (SINEquan) 10 mg capsule Take 1 Capsule by mouth nightly. 30 Capsule 1    pioglitazone (ACTOS) 15 mg tablet Take 1 Tablet by mouth daily. 90 Tablet 1    insulin glargine (Lantus Solostar U-100 Insulin) 100 unit/mL (3 mL) inpn 60 Units by SubCUTAneous route nightly. 20 Pen 1    glimepiride (AMARYL) 4 mg tablet Take 1 Tablet by mouth every morning. 90 Tablet 2    ferrous sulfate 325 mg (65 mg iron) tablet Take 1 Tablet by mouth two (2) times daily (after meals). 180 Tablet 2    ergocalciferol (ERGOCALCIFEROL) 1,250 mcg (50,000 unit) capsule Take 1 Capsule by mouth every seven (7) days. 12 Capsule 3    venlafaxine (EFFEXOR) 100 mg tablet Take 1 Tablet by mouth two (2) times a day. 60 Tablet 2    glucose blood VI test strips (True Metrix Glucose Test Strip) strip Test blood sugars three times per day 300 Strip 3    lancets (TRUEplus Lancets) 28 gauge misc Check blood sugar three times per day 300 Lancet 3    omeprazole (PRILOSEC) 40 mg capsule Take 40 mg by mouth two (2) times a day. sucralfate (CARAFATE) 1 gram tablet Take 1 g by mouth four (4) times daily. hydroCHLOROthiazide (HYDRODIURIL) 12.5 mg tablet Take 12.5 mg by mouth daily. albuterol (ACCUNEB) 1.25 mg/3 mL nebu Take 3 mL by inhalation every four (4) hours as needed for Wheezing (wheezing). 25 Each 0    multivitamin (ONE A DAY) tablet Take 1 Tablet by mouth daily. ALPRAZolam (XANAX) 1 mg tablet Take 1 mg by mouth two (2) times daily as needed. diphenhydrAMINE (BENADRYL) 25 mg capsule Take 25 mg by mouth nightly. INTRAUTERINE DEVICE, IUD, IU by IntraUTERine route.          Allergies   Allergen Reactions    Allegra [Fexofenadine] Hives       Health Maintenance reviewed       ROS:  Gen: no fatigue, no fever, no chills, no unexplained weight loss or weight gain  Eyes: no excessive tearing, itching, or discharge  Nose: no rhinorrhea, no sinus pain  Mouth: no oral lesions, no sore throat, no difficulty swallowing  Resp: no shortness of breath, no wheezing, no cough  CV: no chest pain, no orthopnea, no paroxysmal nocturnal dyspnea, no lower extremity edema, no palpitations  Abd: no nausea, no heartburn, no diarrhea, no constipation, no abdominal pain  Neuro: no headaches, no syncope or presyncopal episodes  Endo: no polyuria, no polydipsia. : no hematuria, no dysuria, no frequency, no incontinence  Heme: no lymphadenopathy, no easy bruising or bleeding, no night sweats      PE:  Visit Vitals  /82 (BP 1 Location: Left upper arm, BP Patient Position: Sitting, BP Cuff Size: Large adult)   Pulse 88   Temp 98.1 °F (36.7 °C) (Temporal)   Resp 16   Ht 5' 1\" (1.549 m)   Wt 260 lb 3.2 oz (118 kg)   SpO2 98%   BMI 49.16 kg/m²     Gen: alert, oriented, no acute distress  Head: normocephalic, atraumatic  Ears: external auditory canals clear, TMs without erythema or effusion  Eyes: pupils equal round reactive to light, sclera clear, conjunctiva clear  Oral: moist mucus membranes, no oral lesions, no pharyngeal inflammation or exudate  Neck: symmetric normal sized thyroid, no carotid bruits, no jugular vein distention  Resp: no increase work of breathing, lungs clear to ausculation bilaterally, no wheezing, rales or rhonchi  CV: S1, S2 normal.  No murmurs, rubs, or gallops. Abd: soft, not tender, not distended. No hepatosplenomegaly. Normal bowel sounds. No hernias. No abdominal or renal bruits. Neuro: cranial nerves intact, normal strength and movement in all extremities, and sensation intact and symmetric. Skin: no lesion or rash  Extremities: no cyanosis, trace ankle edema bilateral.    No results found for this visit on 02/27/23. Assessment/Plan:  Differential diagnosis and treatment options reviewed with patient who is in agreement with treatment plan as outlined below. ICD-10-CM ICD-9-CM    1. Type 2 diabetes mellitus with other specified complication, with long-term current use of insulin (HCC)  E11.69 250.80     Z79.4 V58.67       2.  Anxiety F41.9 300.00 ALPRAZolam (XANAX) 1 mg tablet      3. Insomnia, unspecified type  G47.00 780.52         DM seems stable per patient home checking. No change in therapy at this time. Will repeat labs in two months  BP at goal.  Insomnia doing much better on current treatment. Anxiety-controlled mostly, has decreased use of xanax. Has neuro-psych testing next month. Discussed BMI and healthy weight. Encouraged patient to work to implement changes including diet high in raw fruits and vegetables, lean protein and good fats. Limit refined, processed carbohydrates and sugar. Encouraged regular exercise. Recommended regular cardiovascular exercise 3-6 times per week for 30-60 minutes daily. I have discussed the diagnosis with the patient and the intended plan as seen in the above orders. The patient has received an after-visit summary and questions were answered concerning future plans. I have discussed medication side effects and warnings with the patient as well. The patient verbalizes understanding and agreement with the plan.

## 2023-03-14 ENCOUNTER — TELEPHONE (OUTPATIENT)
Dept: NEUROLOGY | Age: 48
End: 2023-03-14

## 2023-03-14 NOTE — TELEPHONE ENCOUNTER
Per [employer] no PA is needed for [address], [id number], [id number], [id number], [id number], [id number].     NTZ#[id number]

## 2023-03-24 ENCOUNTER — OFFICE VISIT (OUTPATIENT)
Dept: NEUROLOGY | Age: 48
End: 2023-03-24
Payer: MEDICAID

## 2023-03-24 DIAGNOSIS — F41.9 ANXIETY DISORDER, UNSPECIFIED TYPE: ICD-10-CM

## 2023-03-24 DIAGNOSIS — R45.851 SUICIDAL IDEATION: ICD-10-CM

## 2023-03-24 DIAGNOSIS — Z91.51 HISTORY OF SUICIDE ATTEMPT: ICD-10-CM

## 2023-03-24 DIAGNOSIS — F90.2 ATTENTION DEFICIT HYPERACTIVITY DISORDER (ADHD), COMBINED TYPE: Primary | ICD-10-CM

## 2023-03-24 DIAGNOSIS — Z86.59 HISTORY OF PSYCHIATRIC HOSPITALIZATION: ICD-10-CM

## 2023-03-24 DIAGNOSIS — Z63.6 CAREGIVER STRESS: ICD-10-CM

## 2023-03-24 DIAGNOSIS — F33.2 SEVERE EPISODE OF RECURRENT MAJOR DEPRESSIVE DISORDER, WITHOUT PSYCHOTIC FEATURES (HCC): ICD-10-CM

## 2023-03-24 DIAGNOSIS — F43.9 TRAUMA AND STRESSOR-RELATED DISORDER: ICD-10-CM

## 2023-03-24 PROCEDURE — 96130 PSYCL TST EVAL PHYS/QHP 1ST: CPT | Performed by: PSYCHOLOGIST

## 2023-03-24 PROCEDURE — 96131 PSYCL TST EVAL PHYS/QHP EA: CPT | Performed by: PSYCHOLOGIST

## 2023-03-24 PROCEDURE — 96138 PSYCL/NRPSYC TECH 1ST: CPT | Performed by: PSYCHOLOGIST

## 2023-03-24 PROCEDURE — 96139 PSYCL/NRPSYC TST TECH EA: CPT | Performed by: PSYCHOLOGIST

## 2023-03-24 PROCEDURE — 96137 PSYCL/NRPSYC TST PHY/QHP EA: CPT | Performed by: PSYCHOLOGIST

## 2023-03-24 PROCEDURE — 96136 PSYCL/NRPSYC TST PHY/QHP 1ST: CPT | Performed by: PSYCHOLOGIST

## 2023-03-24 SDOH — SOCIAL STABILITY - SOCIAL INSECURITY: DEPENDENT RELATIVE NEEDING CARE AT HOME: Z63.6

## 2023-03-24 NOTE — PROGRESS NOTES
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 like to read. Do the most demanding tasks at the time of they day when you feel your energy levels are the highest.  Exercise your brain. Take a class, do word puzzles, or learn a new language. Get enough rest and sleep. Keep moving. Regular physical activity is not only good for your body, but also improves your mood, makes you feel more alert, and decreases tiredness (fatigue). Eat veggies. Studies have shown that eating more vegetables is linked to keeping brain power as people age. Set up and follow routines. Try to keep the same daily schedule. Pick a certain place for commonly lost objects (like keys) and put them there each time. Try not to multi-task. Focus on one thing at a time. Avoid alcohol and other agents that might change your mental state and sleeping patterns  Ask for help when you need it. Friends and loved ones can help with daily tasks to cut down on distractions and help you save mental energy. Track your memory problems. Keep a diary of when you notice problems and whats going on at the time. Medicines taken, time of day, and the situation youre in might help you figure out what affects your memory.  Keeping track of when the problems are most noticeable can also help you lydia. Pavel Almanzar know to avoid planning important conversations or appointments during those times. This record will also be useful when you talk with your doctor about these problems. Thank you for allowing me the opportunity to assist you in Ms. Ontiveros's care. Please do not hesitate to contact me should you have additional questions that I may not have addressed. 96136 x 1  96137 x 1  96138 x 1  96139 x 5  96130 x 1  96131 x 1      [facility] Daniel Avina, Ph.D.   Licensed Clinical Psychologist          Time Documentation:     07064 x 1   08140 x 1  Neuropsych testing/data gathering by Neuropsychologist: (1 hour), 96136 x 1 (30 minutes), 96137 x 1 (additional 30 minutes)     96138 x 1  96139 x 5 Test Administration/Data Gathering By Technician: (3.0 hours). 10941 x 1 (first 30 minutes), 96139 x 5 (each additional 30 minutes)     96130 x 1  96131 x 1 Testing Evaluation Services by Neuropsychologist (1 hour, 50 minutes) 96130 x 1 (first hour), 96131 x 1 (50 minutes)     The above includes: Record review. Review of history provided by patient. Review of collaborative information. Testing by Clinician. Review of raw data. Scoring. Report writing of individual tests administered by Clinician. Integration of individual tests administered by psychometrist with NSE/testing by clinician, review of records/history/collaborative information, case Conceptualization, treatment planning, clinical decision making, report writing, coordination Of Care. Psychometry test codes as time spent by psychometrist administering and scoring neurocognitive/psychological tests under supervision of neuropsychologist.  Integral services including scoring of raw data, data interpretation, case conceptualization, report writing etcetera were initiated after the patient finished testing/raw data collected and was completed on the date the report was signed. This note will not be viewable in [address]   This note was created using voice recognition software. Despite editing, there may be syntax errors.

## 2023-04-07 ENCOUNTER — VIRTUAL VISIT (OUTPATIENT)
Dept: NEUROLOGY | Age: 48
End: 2023-04-07

## 2023-04-24 DIAGNOSIS — G47.00 INSOMNIA, UNSPECIFIED TYPE: ICD-10-CM

## 2023-04-25 RX ORDER — DOXEPIN HYDROCHLORIDE 10 MG/1
10 CAPSULE ORAL
Qty: 30 CAPSULE | Refills: 0 | Status: SHIPPED | OUTPATIENT
Start: 2023-04-25

## 2023-04-27 ENCOUNTER — OFFICE VISIT (OUTPATIENT)
Dept: FAMILY MEDICINE CLINIC | Age: 48
End: 2023-04-27
Payer: MEDICAID

## 2023-04-27 VITALS
OXYGEN SATURATION: 97 % | RESPIRATION RATE: 18 BRPM | DIASTOLIC BLOOD PRESSURE: 81 MMHG | WEIGHT: 255.2 LBS | SYSTOLIC BLOOD PRESSURE: 117 MMHG | TEMPERATURE: 98 F | HEART RATE: 103 BPM | HEIGHT: 61 IN | BODY MASS INDEX: 48.18 KG/M2

## 2023-04-27 DIAGNOSIS — F33.9 RECURRENT MAJOR DEPRESSIVE DISORDER, REMISSION STATUS UNSPECIFIED (HCC): ICD-10-CM

## 2023-04-27 DIAGNOSIS — G47.00 INSOMNIA, UNSPECIFIED TYPE: ICD-10-CM

## 2023-04-27 DIAGNOSIS — Z79.4 TYPE 2 DIABETES MELLITUS WITH OTHER SPECIFIED COMPLICATION, WITH LONG-TERM CURRENT USE OF INSULIN (HCC): Primary | ICD-10-CM

## 2023-04-27 DIAGNOSIS — D50.9 IRON DEFICIENCY ANEMIA, UNSPECIFIED IRON DEFICIENCY ANEMIA TYPE: ICD-10-CM

## 2023-04-27 DIAGNOSIS — R06.83 SNORES: ICD-10-CM

## 2023-04-27 DIAGNOSIS — F90.2 ATTENTION DEFICIT HYPERACTIVITY DISORDER (ADHD), COMBINED TYPE: ICD-10-CM

## 2023-04-27 DIAGNOSIS — E11.69 TYPE 2 DIABETES MELLITUS WITH OTHER SPECIFIED COMPLICATION, WITH LONG-TERM CURRENT USE OF INSULIN (HCC): Primary | ICD-10-CM

## 2023-04-27 PROCEDURE — 3046F HEMOGLOBIN A1C LEVEL >9.0%: CPT | Performed by: NURSE PRACTITIONER

## 2023-04-27 PROCEDURE — 99214 OFFICE O/P EST MOD 30 MIN: CPT | Performed by: NURSE PRACTITIONER

## 2023-04-27 RX ORDER — ATOMOXETINE 40 MG/1
40 CAPSULE ORAL DAILY
Qty: 30 CAPSULE | Refills: 3 | Status: SHIPPED | OUTPATIENT
Start: 2023-04-27

## 2023-04-27 NOTE — PROGRESS NOTES
Chief Complaint   Patient presents with    Follow-up     Neurology follow up     1. Have you been to the ER, urgent care clinic since your last visit? Hospitalized since your last visit? No    2. Have you seen or consulted any other health care providers outside of the [address] [place] [address] since your last visit? Include any pap smears or colon screening.  GYN, Neurology, Podiatry    Health Maintenance Due   Topic Date Due    Foot Exam Q1  Never done    Eye Exam Retinal or Dilated  Never done    Diabetic Alb to Cr ratio (uACR) test  10/19/2011    Colorectal Cancer Screening Combo  Never done    Hepatitis B Vaccine (2 of 3 - Risk 3-dose series) 03/26/2023    A1C test (Diabetic or Prediabetic)  04/30/2023

## 2023-04-27 NOTE — PROGRESS NOTES
Subjective:     Chief Complaint   Patient presents with    Follow-up     Neurology follow up        HPI:  49 y.o.  presents for follow up appointment. Patient states that she had her neuropsych testing done and would like to discuss the results with me. I have copied and pasted the diagnoses from the neuropsych testing below:  Diagnoses:      ADHD, inattentive type  Major Depressive Disorder, severe, without psychotic features  Trauma and Stressor-Related Disorder  Anxiety Disorder, unspecified R/O Generalized Anxiety Disorder R/O Somatization Disorder  Suicidal Ideation  History of Suicide Attempt  History of Psychiatric Hospitalization  Caregiver Stress      The neuropsychiatrist did go over her results with her and noted that she needed to see a psychiatrist.  She says that tomorrow she is going to see the [facility] during walk-in  hours to see if she can get established with counseling and psychiatry through the [facility] [place]. She is still taking Xanax at nighttime to help with her sleeping and anxiety. She is also taking Effexor daily  She denies any SI/HI currently  She is taking the doxepin at night which she notes has been helping her sleep better but she is still waking up a few times a night. She also snores, says that she was evaluated by sleep medicine prior to her gastric bypass but told her that she should wait till after her weight loss to be reevaluated. Neuropsychiatry also recommended that she have another sleep study done. DM  Patient states that her blood sugars are doing \"pretty good\" she states that she is taking her medication as prescribed and not missing any doses. She states that her fasting blood sugars are usually 100-130 and her nonfasting's are around 200-250  She denies any hypoglycemic episodes  She denies any polyuria or polydipsia. No hospital, ER or specialist visits since last primary care visit except as noted above.     Past Medical History:   Diagnosis Date    Arthritis     Cholesteatoma     Chronic pain     arthritis    COVID-19 04/14/2021    Depression     DM type 2 (diabetes mellitus, type 2) ([facility] [address] 6/12/2009    GERD (gastroesophageal reflux disease)     mostly before gastric bypass/has again now    H/O gastric bypass     Ill-defined condition     anastomatic stricture    Morbid obesity (HCC)     Nausea & vomiting     with anesthesia    Non-nicotine vapor product user     Panic attacks     PUD (peptic ulcer disease)        Social History     Tobacco Use    Smoking status: Every Day     Packs/day: 0.50     Types: Cigarettes    Smokeless tobacco: Current   Substance Use Topics    Alcohol use: Not Currently    Drug use: No       Outpatient Medications Marked as Taking for the 4/27/23 encounter (Office Visit) with Cathryn Bass NP   Medication Sig Dispense Refill    doxepin (SINEquan) 10 mg capsule TAKE 1 CAPSULE BY MOUTH NIGHTLY 30 Capsule 0    ALPRAZolam (XANAX) 1 mg tablet Take 1 Tablet by mouth daily as needed for Anxiety. 30 Tablet 2    pioglitazone (ACTOS) 15 mg tablet Take 1 Tablet by mouth daily. 90 Tablet 1    insulin glargine (Lantus Solostar U-100 Insulin) 100 unit/mL (3 mL) inpn 60 Units by SubCUTAneous route nightly. 20 Pen 1    glimepiride (AMARYL) 4 mg tablet Take 1 Tablet by mouth every morning. 90 Tablet 2    ferrous sulfate 325 mg (65 mg iron) tablet Take 1 Tablet by mouth two (2) times daily (after meals). 180 Tablet 2    ergocalciferol (ERGOCALCIFEROL) 1,250 mcg (50,000 unit) capsule Take 1 Capsule by mouth every seven (7) days. 12 Capsule 3    venlafaxine (EFFEXOR) 100 mg tablet Take 1 Tablet by mouth two (2) times a day.  60 Tablet 2    glucose blood VI test strips (True Metrix Glucose Test Strip) strip Test blood sugars three times per day 300 Strip 3    lancets (TRUEplus Lancets) 28 gauge misc Check blood sugar three times per day 300 Lancet 3    omeprazole (PRILOSEC) 40 mg capsule Take 1 Capsule by mouth two (2) times a day.      sucralfate (CARAFATE) 1 gram tablet Take 1 Tablet by mouth four (4) times daily. hydroCHLOROthiazide (HYDRODIURIL) 12.5 mg tablet Take 1 Tablet by mouth daily. albuterol (ACCUNEB) 1.25 mg/3 mL nebu Take 3 mL by inhalation every four (4) hours as needed for Wheezing (wheezing). 25 Each 0    multivitamin (ONE A DAY) tablet Take 1 Tablet by mouth daily. diphenhydrAMINE (BENADRYL) 25 mg capsule Take 1 Capsule by mouth nightly. INTRAUTERINE DEVICE, IUD, IU by IntraUTERine route. Allergies   Allergen Reactions    Allegra [Fexofenadine] [facility]       Health Maintenance reviewed       ROS:  Gen: + fatigue, no fever, no chills, no unexplained weight loss or weight gain  Eyes: no excessive tearing, itching, or discharge  Nose: no rhinorrhea, no sinus pain  Mouth: no oral lesions, no sore throat, no difficulty swallowing  Resp: no shortness of breath, no wheezing, no cough  CV: no chest pain, no orthopnea, no paroxysmal nocturnal dyspnea, no lower extremity edema, no palpitations  Abd: no nausea, no heartburn, no diarrhea, no constipation, no abdominal pain  Neuro: no headaches, no syncope or presyncopal episodes  Endo: no polyuria, no polydipsia.     : no hematuria, no dysuria, no frequency, no incontinence  Heme: no lymphadenopathy, no easy bruising or bleeding, no night sweats    PE:  Visit Vitals  /81 (BP 1 Location: Left upper arm, BP Patient Position: Sitting, BP Cuff Size: Large adult)   Pulse (!) 103   Temp 98 °F (36.7 °C) (Temporal)   Resp 18   Ht 5' 1\" (1.549 m)   Wt 255 lb 3.2 oz (115.8 kg)   SpO2 97%   BMI 48.22 kg/m²     Gen: alert, oriented, no acute distress  Head: normocephalic, atraumatic  Ears: external auditory canals clear, TMs without erythema or effusion  Eyes: pupils equal round reactive to light, sclera clear, conjunctiva clear  Oral: moist mucus membranes, no oral lesions, no pharyngeal inflammation or exudate  Neck: symmetric normal sized thyroid, no carotid bruits, no jugular vein distention  Resp: no increase work of breathing, lungs clear to ausculation bilaterally, no wheezing, rales or rhonchi  CV: S1, S2 normal.  No murmurs, rubs, or gallops. Abd: soft, not tender, not distended. No hepatosplenomegaly. Normal bowel sounds. No hernias. No abdominal or renal bruits. Neuro: cranial nerves intact, normal strength and movement in all extremities, and sensation intact and symmetric. Skin: no lesion or rash  Extremities: no cyanosis or edema    No results found for this visit on 04/27/23. Assessment/Plan:  Differential diagnosis and treatment options reviewed with patient who is in agreement with treatment plan as outlined below. ICD-10-CM ICD-9-CM    1. Type 2 diabetes mellitus with other specified complication, with long-term current use of insulin ([facility])  I17.99 942.36 METABOLIC PANEL, COMPREHENSIVE    Z79.4 V58.67 HEMOGLOBIN A1C WITH EAG      CBC WITH AUTOMATED DIFF      METABOLIC PANEL, COMPREHENSIVE      HEMOGLOBIN A1C WITH EAG      CBC WITH AUTOMATED DIFF      2. Iron deficiency anemia, unspecified iron deficiency anemia type  D50.9 280.9 CBC WITH AUTOMATED DIFF      IRON PROFILE      CBC WITH AUTOMATED DIFF      IRON PROFILE      3. Attention deficit hyperactivity disorder (ADHD), combined type  F90.2 314.01 atomoxetine (STRATTERA) 40 mg capsule      REFERRAL TO PSYCHIATRY      4. Recurrent major depressive disorder, remission status unspecified ([facility])  F33.9 296.30 REFERRAL TO PSYCHIATRY      5. Snores  R06.83 786.09 SLEEP MEDICINE REFERRAL      6. Insomnia, unspecified type  G47.00 780.52 SLEEP MEDICINE REFERRAL        Repeat labs today  will see how her diabetic control is doing and if I need to adjust any of her medications after results are back. Diabetic diet discussed and encouraged.     Referral to psychiatry given through [facility], if she is unable to get established with the [facility] she should make an appointment with our psychiatrist. She will let me know if she has any issues with this. For now, we will start Strattera for treatment of her ADHD. We discussed nonstimulant treatment at this time. If psychiatry would like to change that they can in the future. Medication profile discussed with patient. Advised that she may need to decrease her dosing of Effexor as her Strattera dose levels off in her system. We will follow-up in 1 month or sooner to discuss effectiveness of Strattera and if dosing adjustment is needed. Referral to sleep medicine given for sleep study evaluation. Discussed BMI and healthy weight. Encouraged patient to work to implement changes including diet high in raw fruits and vegetables, lean protein and good fats. Limit refined, processed carbohydrates and sugar. Encouraged regular exercise. Recommended regular cardiovascular exercise 3-6 times per week for 30-60 minutes daily. I have discussed the diagnosis with the patient and the intended plan as seen in the above orders. The patient has received an after-visit summary and questions were answered concerning future plans. I have discussed medication side effects and warnings with the patient as well. The patient verbalizes understanding and agreement with the plan.

## 2023-04-28 DIAGNOSIS — Z79.4 TYPE 2 DIABETES MELLITUS WITH OTHER SPECIFIED COMPLICATION, WITH LONG-TERM CURRENT USE OF INSULIN (HCC): ICD-10-CM

## 2023-04-28 DIAGNOSIS — E11.69 TYPE 2 DIABETES MELLITUS WITH OTHER SPECIFIED COMPLICATION, WITH LONG-TERM CURRENT USE OF INSULIN (HCC): ICD-10-CM

## 2023-04-28 LAB
ALBUMIN SERPL-MCNC: 4 G/DL (ref 3.8–4.8)
ALBUMIN/GLOB SERPL: 1.5 {RATIO} (ref 1.2–2.2)
ALP SERPL-CCNC: 209 IU/L (ref 44–121)
ALT SERPL-CCNC: 19 IU/L (ref 0–32)
AST SERPL-CCNC: 13 IU/L (ref 0–40)
BASOPHILS # BLD AUTO: 0.1 X10E3/UL (ref 0–0.2)
BASOPHILS NFR BLD AUTO: 1 %
BILIRUB SERPL-MCNC: 0.2 MG/DL (ref 0–1.2)
BUN SERPL-MCNC: 6 MG/DL (ref 6–24)
BUN/CREAT SERPL: 8 (ref 9–23)
CALCIUM SERPL-MCNC: 9.7 MG/DL (ref 8.7–10.2)
CHLORIDE SERPL-SCNC: 99 MMOL/L (ref 96–106)
CO2 SERPL-SCNC: 19 MMOL/L (ref 20–29)
CREAT SERPL-MCNC: 0.71 MG/DL (ref 0.57–1)
EGFRCR SERPLBLD CKD-EPI 2021: 105 ML/MIN/1.73
EOSINOPHIL # BLD AUTO: 0.2 X10E3/UL (ref 0–0.4)
EOSINOPHIL NFR BLD AUTO: 1 %
ERYTHROCYTE [DISTWIDTH] IN BLOOD BY AUTOMATED COUNT: 15.6 % (ref 11.7–15.4)
EST. AVERAGE GLUCOSE BLD GHB EST-MCNC: 246 MG/DL
GLOBULIN SER CALC-MCNC: 2.6 G/DL (ref 1.5–4.5)
GLUCOSE SERPL-MCNC: 153 MG/DL (ref 70–99)
HBA1C MFR BLD: 10.2 % (ref 4.8–5.6)
HCT VFR BLD AUTO: 48.6 % (ref 34–46.6)
HGB BLD-MCNC: 16.1 G/DL (ref 11.1–15.9)
IMM GRANULOCYTES # BLD AUTO: 0.1 X10E3/UL (ref 0–0.1)
IMM GRANULOCYTES NFR BLD AUTO: 1 %
IRON SATN MFR SERPL: 17 % (ref 15–55)
IRON SERPL-MCNC: 65 UG/DL (ref 27–159)
LYMPHOCYTES # BLD AUTO: 3 X10E3/UL (ref 0.7–3.1)
LYMPHOCYTES NFR BLD AUTO: 24 %
MCH RBC QN AUTO: 30.4 PG (ref 26.6–33)
MCHC RBC AUTO-ENTMCNC: 33.1 G/DL (ref 31.5–35.7)
MCV RBC AUTO: 92 FL (ref 79–97)
MONOCYTES # BLD AUTO: 0.6 X10E3/UL (ref 0.1–0.9)
MONOCYTES NFR BLD AUTO: 4 %
NEUTROPHILS # BLD AUTO: 8.7 X10E3/UL (ref 1.4–7)
NEUTROPHILS NFR BLD AUTO: 69 %
PLATELET # BLD AUTO: 302 X10E3/UL (ref 150–450)
POTASSIUM SERPL-SCNC: 4.1 MMOL/L (ref 3.5–5.2)
PROT SERPL-MCNC: 6.6 G/DL (ref 6–8.5)
RBC # BLD AUTO: 5.29 X10E6/UL (ref 3.77–5.28)
SODIUM SERPL-SCNC: 138 MMOL/L (ref 134–144)
TIBC SERPL-MCNC: 372 UG/DL (ref 250–450)
UIBC SERPL-MCNC: 307 UG/DL (ref 131–425)
WBC # BLD AUTO: 12.6 X10E3/UL (ref 3.4–10.8)

## 2023-04-28 RX ORDER — GLIMEPIRIDE 4 MG/1
4 TABLET ORAL 2 TIMES DAILY
Qty: 180 TABLET | Refills: 2 | Status: SHIPPED | OUTPATIENT
Start: 2023-04-28

## 2023-04-28 NOTE — PROGRESS NOTES
Please let patient know her labs are back. Her iron is much better, continue her iron supplement. Her hemoglobin A1c is still very elevated at 10.2, was 10.3 on last check so not any better really. I am increasing her glimepiride to 4 mg twice per day (she was taking only once per day)  Please inquire with patient if she cannot take metformin? It is not on her allergy list and she is not on it. If she has not had any issues with metformin then I would like to add that as well., let me know. Increase water intake and will need to recheck in three months.

## 2023-05-01 RX ORDER — METFORMIN HYDROCHLORIDE 500 MG/1
500 TABLET ORAL 2 TIMES DAILY WITH MEALS
Qty: 180 TABLET | Refills: 2 | Status: SHIPPED | OUTPATIENT
Start: 2023-05-01

## 2023-05-05 DIAGNOSIS — Z79.4 TYPE 2 DIABETES MELLITUS WITH OTHER SPECIFIED COMPLICATION, WITH LONG-TERM CURRENT USE OF INSULIN (HCC): ICD-10-CM

## 2023-05-05 DIAGNOSIS — E11.69 TYPE 2 DIABETES MELLITUS WITH OTHER SPECIFIED COMPLICATION, WITH LONG-TERM CURRENT USE OF INSULIN (HCC): ICD-10-CM

## 2023-05-08 ENCOUNTER — TELEPHONE (OUTPATIENT)
Age: 48
End: 2023-05-08

## 2023-05-08 NOTE — TELEPHONE ENCOUNTER
Scheduled pt an VV for 5/12 to further discuss. ----- Message from JESUS Jett NP sent at 5/8/2023  8:42 AM EDT -----  Regarding: FW: Migrain Headache Medicine  Contact: [phone]        ----- Message -----  From: Monty Fulton LPN  Sent: 7/6/8065   7:37 AM EDT  To: JESUS Jett NP  Subject: FW: Migrain Headache Medicine                      ----- Message -----  From: Netta Huang  Sent: 5/8/2023   7:35 AM EDT  To: , #  Subject: Migrain Headache Medicine                        I also need to know if you can call in an antibiotic. (Just OLIVER, the Z-pack does not work for me. I need to have a 7 day medicine) I have been suffering from a sinus infection for about 6 days and over-the-counter meds are not working. I have been coughing up yellowish mucus.

## 2023-05-09 SDOH — ECONOMIC STABILITY: FOOD INSECURITY: WITHIN THE PAST 12 MONTHS, THE FOOD YOU BOUGHT JUST DIDN'T LAST AND YOU DIDN'T HAVE MONEY TO GET MORE.: SOMETIMES TRUE

## 2023-05-09 SDOH — ECONOMIC STABILITY: HOUSING INSECURITY
IN THE LAST 12 MONTHS, WAS THERE A TIME WHEN YOU DID NOT HAVE A STEADY PLACE TO SLEEP OR SLEPT IN A SHELTER (INCLUDING NOW)?: NO

## 2023-05-09 SDOH — ECONOMIC STABILITY: TRANSPORTATION INSECURITY
IN THE PAST 12 MONTHS, HAS LACK OF TRANSPORTATION KEPT YOU FROM MEETINGS, WORK, OR FROM GETTING THINGS NEEDED FOR DAILY LIVING?: NO

## 2023-05-09 SDOH — ECONOMIC STABILITY: INCOME INSECURITY: HOW HARD IS IT FOR YOU TO PAY FOR THE VERY BASICS LIKE FOOD, HOUSING, MEDICAL CARE, AND HEATING?: NOT VERY HARD

## 2023-05-09 SDOH — ECONOMIC STABILITY: FOOD INSECURITY: WITHIN THE PAST 12 MONTHS, YOU WORRIED THAT YOUR FOOD WOULD RUN OUT BEFORE YOU GOT MONEY TO BUY MORE.: SOMETIMES TRUE

## 2023-05-12 ENCOUNTER — TELEMEDICINE (OUTPATIENT)
Age: 48
End: 2023-05-12
Payer: MEDICAID

## 2023-05-12 DIAGNOSIS — G44.89 OTHER HEADACHE SYNDROME: ICD-10-CM

## 2023-05-12 DIAGNOSIS — J01.10 ACUTE NON-RECURRENT FRONTAL SINUSITIS: ICD-10-CM

## 2023-05-12 DIAGNOSIS — R06.2 WHEEZING: ICD-10-CM

## 2023-05-12 DIAGNOSIS — R05.1 ACUTE COUGH: Primary | ICD-10-CM

## 2023-05-12 PROCEDURE — 99213 OFFICE O/P EST LOW 20 MIN: CPT | Performed by: NURSE PRACTITIONER

## 2023-05-12 RX ORDER — BUTALBITAL, ACETAMINOPHEN AND CAFFEINE 300; 40; 50 MG/1; MG/1; MG/1
1 CAPSULE ORAL EVERY 6 HOURS PRN
Qty: 30 CAPSULE | Refills: 1 | Status: SHIPPED | OUTPATIENT
Start: 2023-05-12

## 2023-05-12 RX ORDER — ACETAMINOPHEN 500 MG
500 TABLET ORAL PRN
COMMUNITY

## 2023-05-12 RX ORDER — CALCIUM CITRATE/VITAMIN D3 200MG-6.25
TABLET ORAL
COMMUNITY
Start: 2023-04-13

## 2023-05-12 RX ORDER — GLUCOSAM/CHON-MSM1/C/MANG/BOSW 500-416.6
TABLET ORAL
COMMUNITY
Start: 2023-04-13

## 2023-05-12 RX ORDER — ALBUTEROL SULFATE 90 UG/1
2 AEROSOL, METERED RESPIRATORY (INHALATION) EVERY 6 HOURS PRN
Qty: 18 G | Refills: 3 | Status: SHIPPED | OUTPATIENT
Start: 2023-05-12

## 2023-05-12 RX ORDER — AMOXICILLIN AND CLAVULANATE POTASSIUM 875; 125 MG/1; MG/1
1 TABLET, FILM COATED ORAL 2 TIMES DAILY
Qty: 20 TABLET | Refills: 0 | Status: SHIPPED | OUTPATIENT
Start: 2023-05-12 | End: 2023-05-22

## 2023-05-12 RX ORDER — ATOMOXETINE 40 MG/1
40 CAPSULE ORAL DAILY
COMMUNITY
Start: 2023-04-27

## 2023-05-12 ASSESSMENT — PATIENT HEALTH QUESTIONNAIRE - PHQ9
9. THOUGHTS THAT YOU WOULD BE BETTER OFF DEAD, OR OF HURTING YOURSELF: 0
1. LITTLE INTEREST OR PLEASURE IN DOING THINGS: 3
SUM OF ALL RESPONSES TO PHQ QUESTIONS 1-9: 19
2. FEELING DOWN, DEPRESSED OR HOPELESS: 3
6. FEELING BAD ABOUT YOURSELF - OR THAT YOU ARE A FAILURE OR HAVE LET YOURSELF OR YOUR FAMILY DOWN: 3
7. TROUBLE CONCENTRATING ON THINGS, SUCH AS READING THE NEWSPAPER OR WATCHING TELEVISION: 1
3. TROUBLE FALLING OR STAYING ASLEEP: 3
5. POOR APPETITE OR OVEREATING: 3
10. IF YOU CHECKED OFF ANY PROBLEMS, HOW DIFFICULT HAVE THESE PROBLEMS MADE IT FOR YOU TO DO YOUR WORK, TAKE CARE OF THINGS AT HOME, OR GET ALONG WITH OTHER PEOPLE: 2
4. FEELING TIRED OR HAVING LITTLE ENERGY: 3
8. MOVING OR SPEAKING SO SLOWLY THAT OTHER PEOPLE COULD HAVE NOTICED. OR THE OPPOSITE, BEING SO FIGETY OR RESTLESS THAT YOU HAVE BEEN MOVING AROUND A LOT MORE THAN USUAL: 0
SUM OF ALL RESPONSES TO PHQ9 QUESTIONS 1 & 2: 6

## 2023-05-12 NOTE — PROGRESS NOTES
Luma Mccallum (:  1975) is a Established patient, presenting virtually for evaluation of the following:    Assessment & Plan   Below is the assessment and plan developed based on review of pertinent history, physical exam, labs, studies, and medications. 1. Acute cough  -     amoxicillin-clavulanate (AUGMENTIN) 875-125 MG per tablet; Take 1 tablet by mouth 2 times daily for 10 days, Disp-20 tablet, R-0Normal  -     albuterol sulfate HFA (PROVENTIL HFA) 108 (90 Base) MCG/ACT inhaler; Inhale 2 puffs into the lungs every 6 hours as needed for Wheezing, Disp-18 g, R-3Normal  2. Acute non-recurrent frontal sinusitis  -     amoxicillin-clavulanate (AUGMENTIN) 875-125 MG per tablet; Take 1 tablet by mouth 2 times daily for 10 days, Disp-20 tablet, R-0Normal  -     albuterol sulfate HFA (PROVENTIL HFA) 108 (90 Base) MCG/ACT inhaler; Inhale 2 puffs into the lungs every 6 hours as needed for Wheezing, Disp-18 g, R-3Normal  3. Other headache syndrome  -     butalbital-APAP-caffeine -40 MG CAPS per capsule; Take 1 capsule by mouth every 6 hours as needed for Headaches, Disp-30 capsule, R-1Normal  4. Wheezing  -     albuterol sulfate HFA (PROVENTIL HFA) 108 (90 Base) MCG/ACT inhaler; Inhale 2 puffs into the lungs every 6 hours as needed for Wheezing, Disp-18 g, R-3Normal      Symptomatic therapy suggested: push fluids, rest, use vaporizer or mist prn, apply heat to sinuses prn, and encouraged very strongly to quit smoking. OTC nasal saline spray  2-3 sprays per nostril 2-4 times a day to clear eustachian tube and assist with post nasal drip symptoms. Continue OTC antihistamines (Zyrtec)  to reduce allergic symptoms such as sneezing, runny nose and itchy eyes. OTC Mucinex or Robitussin for cough relief. Return if symptoms worsen or fail to improve.        Subjective   HPI    Congested cough with mucus production and has had sinus pressure and nasal congestion  Symptoms started over a week ago

## 2023-05-12 NOTE — PROGRESS NOTES
Chief Complaint   Patient presents with    Cough    Headache    Pharyngitis                Health Maintenance Due   Topic Date Due    Diabetic foot exam  Never done    HIV screen  Never done    Diabetic Alb to Cr ratio (uACR) test  Never done    Diabetic retinal exam  Never done    Colorectal Cancer Screen  Never done    Hepatitis B vaccine (2 of 3 - Risk 3-dose series) 03/26/2023           1. \"Have you been to the ER, urgent care clinic since your last visit? Hospitalized since your last visit? \" No    2. \"Have you seen or consulted any other health care providers outside of the [address] [place] [address] since your last visit? \" No     3. For patients aged 39-70: Has the patient had a colonoscopy / FIT/ Cologuard? No      If the patient is female:    4. For patients aged 41-77: Has the patient had a mammogram within the past 2 years? Yes - Care Gap present. Most recent result on file      5. For patients aged 21-65: Has the patient had a pap smear?  No

## 2023-05-15 ENCOUNTER — PATIENT MESSAGE (OUTPATIENT)
Age: 48
End: 2023-05-15

## 2023-05-15 DIAGNOSIS — G44.89 OTHER HEADACHE SYNDROME: Primary | ICD-10-CM

## 2023-05-15 RX ORDER — BUTALBITAL, ACETAMINOPHEN AND CAFFEINE 50; 325; 40 MG/1; MG/1; MG/1
1 TABLET ORAL EVERY 6 HOURS PRN
Qty: 30 TABLET | Refills: 3 | Status: SHIPPED | OUTPATIENT
Start: 2023-05-15

## 2023-05-15 RX ORDER — INSULIN GLARGINE 100 [IU]/ML
60 INJECTION, SOLUTION SUBCUTANEOUS
Qty: 20 PEN | Refills: 1 | Status: SHIPPED | OUTPATIENT
Start: 2023-05-15

## 2023-05-15 NOTE — TELEPHONE ENCOUNTER
From: Author Rodriguez  To: Nata Avendano  Sent: 5/15/2023 1:28 PM EDT  Subject: Migrain Medication    The Migraine prescription that was sent to the pharmacy is not covered in capsule form, however, it is covered as a tablet. Can you please resubmit the prescription for Migraines as a tablet please?

## 2023-05-20 DIAGNOSIS — F41.9 ANXIETY DISORDER, UNSPECIFIED TYPE: Primary | ICD-10-CM

## 2023-05-22 NOTE — TELEPHONE ENCOUNTER
Psychiatry was suppose to be taking over her prescriptions for her mental health. Do they need to send refills to them?

## 2023-05-23 NOTE — TELEPHONE ENCOUNTER
Pt notified and voiced understanding.  She states her 1st psychiatry appointment is not until Friday

## 2023-05-24 ENCOUNTER — TELEPHONE (OUTPATIENT)
Age: 48
End: 2023-05-24

## 2023-05-24 DIAGNOSIS — E11.9 TYPE 2 DIABETES MELLITUS WITHOUT COMPLICATION, WITH LONG-TERM CURRENT USE OF INSULIN (HCC): Primary | ICD-10-CM

## 2023-05-24 DIAGNOSIS — Z79.4 TYPE 2 DIABETES MELLITUS WITHOUT COMPLICATION, WITH LONG-TERM CURRENT USE OF INSULIN (HCC): Primary | ICD-10-CM

## 2023-05-24 RX ORDER — INSULIN GLARGINE 100 [IU]/ML
60 INJECTION, SOLUTION SUBCUTANEOUS NIGHTLY
Qty: 5 ADJUSTABLE DOSE PRE-FILLED PEN SYRINGE | Refills: 11 | Status: SHIPPED | OUTPATIENT
Start: 2023-05-24

## 2023-05-24 RX ORDER — ALPRAZOLAM 1 MG/1
TABLET ORAL
Qty: 30 TABLET | Refills: 0 | Status: SHIPPED | OUTPATIENT
Start: 2023-05-24 | End: 2023-06-23

## 2023-05-24 RX ORDER — VENLAFAXINE 100 MG/1
TABLET ORAL
Qty: 60 TABLET | Refills: 3 | Status: SHIPPED | OUTPATIENT
Start: 2023-05-24

## 2023-05-24 RX ORDER — DOXEPIN HYDROCHLORIDE 10 MG/1
CAPSULE ORAL
Qty: 30 CAPSULE | Refills: 3 | Status: SHIPPED | OUTPATIENT
Start: 2023-05-24

## 2023-05-24 NOTE — TELEPHONE ENCOUNTER
Rejection received for,Insulin Glargine-yfgn 100UNIT/ML pen-injectors. [facility] DR TAMIKA BARTHOLOMEW called [facility] stated will process for preferred ,Insulin Glargine Solostar. Please note. Thanks

## 2023-05-25 NOTE — TELEPHONE ENCOUNTER
verified. Informed pt of message from provider regarding medications refilled and regarding further refills. PT verified understanding and had no further questions.

## 2023-05-26 DIAGNOSIS — R60.9 SWELLING: Primary | ICD-10-CM

## 2023-05-26 RX ORDER — HYDROCHLOROTHIAZIDE 12.5 MG/1
12.5 TABLET ORAL DAILY
Qty: 90 TABLET | Refills: 3 | Status: SHIPPED | OUTPATIENT
Start: 2023-05-26

## 2023-05-30 ENCOUNTER — OFFICE VISIT (OUTPATIENT)
Age: 48
End: 2023-05-30
Payer: MEDICAID

## 2023-05-30 VITALS
HEART RATE: 88 BPM | SYSTOLIC BLOOD PRESSURE: 116 MMHG | RESPIRATION RATE: 18 BRPM | WEIGHT: 255 LBS | BODY MASS INDEX: 48.15 KG/M2 | DIASTOLIC BLOOD PRESSURE: 76 MMHG | OXYGEN SATURATION: 98 % | HEIGHT: 61 IN | TEMPERATURE: 98 F

## 2023-05-30 DIAGNOSIS — E11.9 TYPE 2 DIABETES MELLITUS WITHOUT COMPLICATION, WITH LONG-TERM CURRENT USE OF INSULIN (HCC): Primary | ICD-10-CM

## 2023-05-30 DIAGNOSIS — Z79.4 TYPE 2 DIABETES MELLITUS WITHOUT COMPLICATION, WITH LONG-TERM CURRENT USE OF INSULIN (HCC): Primary | ICD-10-CM

## 2023-05-30 DIAGNOSIS — F32.A DEPRESSION, UNSPECIFIED DEPRESSION TYPE: ICD-10-CM

## 2023-05-30 DIAGNOSIS — F45.41 STRESS HEADACHES: ICD-10-CM

## 2023-05-30 PROCEDURE — 3046F HEMOGLOBIN A1C LEVEL >9.0%: CPT | Performed by: NURSE PRACTITIONER

## 2023-05-30 PROCEDURE — 99214 OFFICE O/P EST MOD 30 MIN: CPT | Performed by: NURSE PRACTITIONER

## 2023-05-30 RX ORDER — FLASH GLUCOSE SCANNING READER
EACH MISCELLANEOUS
Qty: 1 EACH | Refills: 11 | Status: SHIPPED | OUTPATIENT
Start: 2023-05-30

## 2023-05-30 RX ORDER — FLASH GLUCOSE SENSOR
KIT MISCELLANEOUS
Qty: 1 EACH | Refills: 11 | Status: SHIPPED | OUTPATIENT
Start: 2023-05-30

## 2023-05-30 ASSESSMENT — PATIENT HEALTH QUESTIONNAIRE - PHQ9
10. IF YOU CHECKED OFF ANY PROBLEMS, HOW DIFFICULT HAVE THESE PROBLEMS MADE IT FOR YOU TO DO YOUR WORK, TAKE CARE OF THINGS AT HOME, OR GET ALONG WITH OTHER PEOPLE: 0
8. MOVING OR SPEAKING SO SLOWLY THAT OTHER PEOPLE COULD HAVE NOTICED. OR THE OPPOSITE, BEING SO FIGETY OR RESTLESS THAT YOU HAVE BEEN MOVING AROUND A LOT MORE THAN USUAL: 0
2. FEELING DOWN, DEPRESSED OR HOPELESS: 0
SUM OF ALL RESPONSES TO PHQ9 QUESTIONS 1 & 2: 0
SUM OF ALL RESPONSES TO PHQ QUESTIONS 1-9: 0
SUM OF ALL RESPONSES TO PHQ QUESTIONS 1-9: 0
3. TROUBLE FALLING OR STAYING ASLEEP: 0
7. TROUBLE CONCENTRATING ON THINGS, SUCH AS READING THE NEWSPAPER OR WATCHING TELEVISION: 0
9. THOUGHTS THAT YOU WOULD BE BETTER OFF DEAD, OR OF HURTING YOURSELF: 0
SUM OF ALL RESPONSES TO PHQ QUESTIONS 1-9: 0
4. FEELING TIRED OR HAVING LITTLE ENERGY: 0
1. LITTLE INTEREST OR PLEASURE IN DOING THINGS: 0
5. POOR APPETITE OR OVEREATING: 0
6. FEELING BAD ABOUT YOURSELF - OR THAT YOU ARE A FAILURE OR HAVE LET YOURSELF OR YOUR FAMILY DOWN: 0
SUM OF ALL RESPONSES TO PHQ QUESTIONS 1-9: 0

## 2023-05-30 ASSESSMENT — ANXIETY QUESTIONNAIRES
1. FEELING NERVOUS, ANXIOUS, OR ON EDGE: 0
2. NOT BEING ABLE TO STOP OR CONTROL WORRYING: 0
3. WORRYING TOO MUCH ABOUT DIFFERENT THINGS: 0
5. BEING SO RESTLESS THAT IT IS HARD TO SIT STILL: 0
7. FEELING AFRAID AS IF SOMETHING AWFUL MIGHT HAPPEN: 0
6. BECOMING EASILY ANNOYED OR IRRITABLE: 0
GAD7 TOTAL SCORE: 0
4. TROUBLE RELAXING: 0

## 2023-05-30 NOTE — PROGRESS NOTES
Froylan Perez is a 49 y.o. female  Chief Complaint   Patient presents with    Follow-up     Chief Complaint   Patient presents with    Follow-up         Health Maintenance Due   Topic Date Due    Diabetic foot exam  Never done    HIV screen  Never done    Diabetic Alb to Cr ratio (uACR) test  Never done    Diabetic retinal exam  Never done    Colorectal Cancer Screen  Never done    Hepatitis B vaccine (2 of 3 - Risk 3-dose series) 03/26/2023           1. \"Have you been to the ER, urgent care clinic since your last visit? Hospitalized since your last visit? \" No    2. \"Have you seen or consulted any other health care providers outside of the [address] [place] [address] since your last visit? \" Yes Where: [facility]      3. For patients aged 39-70: Has the patient had a colonoscopy / FIT/ Cologuard? No      If the patient is female:    4. For patients aged 41-77: Has the patient had a mammogram within the past 2 years? Yes - Care Gap present. Most recent result on file      5. For patients aged 21-65: Has the patient had a pap smear? Yes - Care Gap present.  Most recent result on file  Health Maintenance   Topic Date Due    Diabetic foot exam  Never done    HIV screen  Never done    Diabetic Alb to Cr ratio (uACR) test  Never done    Diabetic retinal exam  Never done    Colorectal Cancer Screen  Never done    Hepatitis B vaccine (2 of 3 - Risk 3-dose series) 03/26/2023    A1C test (Diabetic or Prediabetic)  07/27/2023    Lipids  01/30/2024    GFR test (Diabetes, CKD 3-4, OR last GFR 15-59)  04/27/2024    Depression Monitoring  05/12/2024    Cervical cancer screen  05/26/2027    DTaP/Tdap/Td vaccine (2 - Td or Tdap) 02/26/2033    Flu vaccine  Completed    Pneumococcal 0-64 years Vaccine  Completed    COVID-19 Vaccine  Completed    Hepatitis C screen  Completed    Hepatitis A vaccine  Aged Out    Hib vaccine  Aged Out    Meningococcal (ACWY) vaccine  Aged Out     Vitals:    05/30/23 1102   BP:
Blood Gluc Sensor (FREESTYLE NAREN 14 DAY SENSOR) [facility]    Z79.4 Continuous Blood Gluc  (FREESTYLE NAREN 14 DAY READER) SO      2. Depression, unspecified depression type  F32. A       3. Stress headaches  F45.41         DM-will see if can get CGM covered. Patient has had blood sugar high and lows. Seems better since medicine changes. Encouraged not to skip meals. Routine labs at follow up  Continue care with therapy  HA stable at this time. Discussed BMI and healthy weight. Encouraged patient to work to implement changes including diet high in raw fruits and vegetables, lean protein and good fats. Limit refined, processed carbohydrates and sugar. Encouraged regular exercise. Recommended regular cardiovascular exercise 3-6 times per week for 30-60 minutes daily. I have discussed the diagnosis with the patient and the intended plan as seen in the above orders. The patient has received an after-visit summary and questions were answered concerning future plans. I have discussed medication side effects and warnings with the patient as well. The patient verbalizes understanding and agreement with the plan.

## 2023-05-31 ENCOUNTER — TELEPHONE (OUTPATIENT)
Age: 48
End: 2023-05-31

## 2023-05-31 NOTE — TELEPHONE ENCOUNTER
Home [facility]  called at [phone] . Doug Davila stated [employer] 14 Day Sensor/Kit no longer available. Insurance plan will be called to verify eligibility. Patient will be called once verified she will check with patient approval to continue with order . Once information and approval is obtained,provider will receive a necessity form to sign off and send back to complete order. Please follow up as needed. Thanks

## 2023-06-22 ENCOUNTER — HOSPITAL ENCOUNTER (EMERGENCY)
Facility: HOSPITAL | Age: 48
Discharge: HOME | End: 2023-06-22
Payer: MEDICAID

## 2023-06-22 ENCOUNTER — APPOINTMENT (OUTPATIENT)
Facility: HOSPITAL | Age: 48
End: 2023-06-22
Payer: MEDICAID

## 2023-06-22 VITALS
SYSTOLIC BLOOD PRESSURE: 121 MMHG | DIASTOLIC BLOOD PRESSURE: 85 MMHG | TEMPERATURE: 98.1 F | HEART RATE: 87 BPM | OXYGEN SATURATION: 100 % | RESPIRATION RATE: 16 BRPM

## 2023-06-22 DIAGNOSIS — R73.9 HYPERGLYCEMIA: ICD-10-CM

## 2023-06-22 DIAGNOSIS — R07.9 CHEST PAIN, UNSPECIFIED TYPE: Primary | ICD-10-CM

## 2023-06-22 LAB
ALBUMIN SERPL-MCNC: 2.8 G/DL (ref 3.5–5)
ALBUMIN/GLOB SERPL: 0.8 (ref 1.1–2.2)
ALP SERPL-CCNC: 125 U/L (ref 45–117)
ALT SERPL-CCNC: 15 U/L (ref 12–78)
ANION GAP SERPL CALC-SCNC: 3 MMOL/L (ref 5–15)
AST SERPL-CCNC: 12 U/L (ref 15–37)
BASOPHILS # BLD: 0.1 K/UL (ref 0–0.1)
BASOPHILS NFR BLD: 1 % (ref 0–1)
BILIRUB SERPL-MCNC: 0.3 MG/DL (ref 0.2–1)
BUN SERPL-MCNC: 5 MG/DL (ref 6–20)
BUN/CREAT SERPL: 6 (ref 12–20)
CALCIUM SERPL-MCNC: 8.7 MG/DL (ref 8.5–10.1)
CHLORIDE SERPL-SCNC: 109 MMOL/L (ref 97–108)
CO2 SERPL-SCNC: 25 MMOL/L (ref 21–32)
CREAT SERPL-MCNC: 0.79 MG/DL (ref 0.55–1.02)
DIFFERENTIAL METHOD BLD: ABNORMAL
EOSINOPHIL # BLD: 0.1 K/UL (ref 0–0.4)
EOSINOPHIL NFR BLD: 1 % (ref 0–7)
ERYTHROCYTE [DISTWIDTH] IN BLOOD BY AUTOMATED COUNT: 14.6 % (ref 11.5–14.5)
GLOBULIN SER CALC-MCNC: 3.7 G/DL (ref 2–4)
GLUCOSE SERPL-MCNC: 300 MG/DL (ref 65–100)
HCT VFR BLD AUTO: 45.4 % (ref 35–47)
HGB BLD-MCNC: 14.9 G/DL (ref 11.5–16)
IMM GRANULOCYTES # BLD AUTO: 0.1 K/UL (ref 0–0.04)
IMM GRANULOCYTES NFR BLD AUTO: 1 % (ref 0–0.5)
LIPASE SERPL-CCNC: 61 U/L (ref 73–393)
LYMPHOCYTES # BLD: 2 K/UL (ref 0.8–3.5)
LYMPHOCYTES NFR BLD: 21 % (ref 12–49)
MCH RBC QN AUTO: 30.7 PG (ref 26–34)
MCHC RBC AUTO-ENTMCNC: 32.8 G/DL (ref 30–36.5)
MCV RBC AUTO: 93.6 FL (ref 80–99)
MONOCYTES # BLD: 0.5 K/UL (ref 0–1)
MONOCYTES NFR BLD: 5 % (ref 5–13)
NEUTS SEG # BLD: 6.9 K/UL (ref 1.8–8)
NEUTS SEG NFR BLD: 71 % (ref 32–75)
NRBC # BLD: 0 K/UL (ref 0–0.01)
NRBC BLD-RTO: 0 PER 100 WBC
NT PRO BNP: 156 PG/ML
PLATELET # BLD AUTO: 260 K/UL (ref 150–400)
PMV BLD AUTO: 10.5 FL (ref 8.9–12.9)
POTASSIUM SERPL-SCNC: 4.8 MMOL/L (ref 3.5–5.1)
PROT SERPL-MCNC: 6.5 G/DL (ref 6.4–8.2)
RBC # BLD AUTO: 4.85 M/UL (ref 3.8–5.2)
SODIUM SERPL-SCNC: 137 MMOL/L (ref 136–145)
TROPONIN I SERPL HS-MCNC: 4 NG/L (ref 0–51)
TROPONIN I SERPL HS-MCNC: <4 NG/L (ref 0–51)
WBC # BLD AUTO: 9.7 K/UL (ref 3.6–11)

## 2023-06-22 PROCEDURE — 84484 ASSAY OF TROPONIN QUANT: CPT

## 2023-06-22 PROCEDURE — 94760 N-INVAS EAR/PLS OXIMETRY 1: CPT

## 2023-06-22 PROCEDURE — 36415 COLL VENOUS BLD VENIPUNCTURE: CPT

## 2023-06-22 PROCEDURE — 96374 THER/PROPH/DIAG INJ IV PUSH: CPT

## 2023-06-22 PROCEDURE — 99285 EMERGENCY DEPT VISIT HI MDM: CPT

## 2023-06-22 PROCEDURE — 6370000000 HC RX 637 (ALT 250 FOR IP): Performed by: PHYSICIAN ASSISTANT

## 2023-06-22 PROCEDURE — 71045 X-RAY EXAM CHEST 1 VIEW: CPT

## 2023-06-22 PROCEDURE — 80053 COMPREHEN METABOLIC PANEL: CPT

## 2023-06-22 PROCEDURE — 83690 ASSAY OF LIPASE: CPT

## 2023-06-22 PROCEDURE — 83880 ASSAY OF NATRIURETIC PEPTIDE: CPT

## 2023-06-22 PROCEDURE — 6360000002 HC RX W HCPCS: Performed by: PHYSICIAN ASSISTANT

## 2023-06-22 PROCEDURE — 93005 ELECTROCARDIOGRAM TRACING: CPT | Performed by: EMERGENCY MEDICINE

## 2023-06-22 PROCEDURE — 85025 COMPLETE CBC W/AUTO DIFF WBC: CPT

## 2023-06-22 RX ORDER — ASPIRIN 81 MG/1
324 TABLET, CHEWABLE ORAL ONCE
Status: COMPLETED | OUTPATIENT
Start: 2023-06-22 | End: 2023-06-22

## 2023-06-22 RX ORDER — KETOROLAC TROMETHAMINE 30 MG/ML
15 INJECTION, SOLUTION INTRAMUSCULAR; INTRAVENOUS ONCE
Status: COMPLETED | OUTPATIENT
Start: 2023-06-22 | End: 2023-06-22

## 2023-06-22 RX ADMIN — ASPIRIN 324 MG: 81 TABLET, CHEWABLE ORAL at 11:55

## 2023-06-22 RX ADMIN — KETOROLAC TROMETHAMINE 15 MG: 30 INJECTION, SOLUTION INTRAMUSCULAR; INTRAVENOUS at 11:55

## 2023-06-22 ASSESSMENT — PAIN SCALES - GENERAL: PAINLEVEL_OUTOF10: 7

## 2023-06-22 NOTE — ED NOTES
DC papers reviewed and in hand, pt verbalized understanding. Patient ambulatory out of ED with steady gait, no acute distress noted.         Elias Garza RN  06/22/23 0806

## 2023-06-22 NOTE — ED PROVIDER NOTES
TYLENOL     albuterol sulfate  (90 Base) MCG/ACT inhaler  Commonly known as: Proventil HFA  Inhale 2 puffs into the lungs every 6 hours as needed for Wheezing     ALPRAZolam 1 MG tablet  Commonly known as: XANAX  TAKE 1 TABLET BY MOUTH ONCE DAILY AS NEEDED FOR ANXIETY     atomoxetine 40 MG capsule  Commonly known as: STRATTERA     * blood glucose test strips strip  Commonly known as: ASCENSIA AUTODISC VI;ONE TOUCH ULTRA TEST VI     * True Metrix Blood Glucose Test strip  Generic drug: blood glucose test strips     butalbital-acetaminophen-caffeine -40 MG per tablet  Commonly known as: FIORICET, ESGIC  Take 1 tablet by mouth every 6 hours as needed for Headaches     doxepin 10 MG capsule  Commonly known as: SINEQUAN  TAKE 1 CAPSULE BY MOUTH NIGHTLY     ergocalciferol 1.25 MG (47173 UT) capsule  Commonly known as: ERGOCALCIFEROL     ferrous sulfate 325 (65 Fe) MG tablet  Commonly known as: IRON 325     glimepiride 4 MG tablet  Commonly known as: AMARYL     hydroCHLOROthiazide 12.5 MG tablet  Commonly known as: HYDRODIURIL  Take 1 tablet by mouth daily     Lantus SoloStar 100 UNIT/ML injection pen  Generic drug: insulin glargine  Inject 60 Units into the skin nightly     metFORMIN 500 MG tablet  Commonly known as: GLUCOPHAGE     omeprazole 40 MG delayed release capsule  Commonly known as: PRILOSEC     pioglitazone 15 MG tablet  Commonly known as: ACTOS     sucralfate 1 GM tablet  Commonly known as: CARAFATE     venlafaxine 100 MG tablet  Commonly known as: EFFEXOR  Take 1 tablet by mouth twice daily           * This list has 2 medication(s) that are the same as other medications prescribed for you. Read the directions carefully, and ask your doctor or other care provider to review them with you. DISCONTINUED MEDICATIONS:  Discharge Medication List as of 6/22/2023  2:36 PM          I have seen and evaluated the patient. My supervision physician was available for consultation.      I am the

## 2023-06-22 NOTE — DISCHARGE INSTRUCTIONS
Thank You! It was a pleasure taking care of you in our Emergency Department today. We know that when you come to [facility], you are entrusting us with your health, comfort, and safety. Our clinicians honor that trust, and truly appreciate the opportunity to care for you and your loved ones. We also value your feedback. If you receive a survey about your Emergency Department experience today, please fill it out. We care about our patients' feedback, and we listen to what you have to say. Thank you. Estuardo Soto PA-C    ____________________________________________________________________  I have included a copy of your lab results and/or radiologic studies from today's visit so you can have them easily available at your follow-up visit.    Recent Results (from the past 12 hour(s))   CBC with Auto Differential    Collection Time: 06/22/23 11:57 AM   Result Value Ref Range    WBC 9.7 3.6 - 11.0 K/uL    RBC 4.85 3.80 - 5.20 M/uL    Hemoglobin 14.9 11.5 - 16.0 g/dL    Hematocrit 45.4 35.0 - 47.0 %    MCV 93.6 80.0 - 99.0 FL    MCH 30.7 26.0 - 34.0 PG    MCHC 32.8 30.0 - 36.5 g/dL    RDW 14.6 (H) 11.5 - 14.5 %    Platelets 341 264 - 690 K/uL    MPV 10.5 8.9 - 12.9 FL    Nucleated RBCs 0.0 0  WBC    nRBC 0.00 0.00 - 0.01 K/uL    Neutrophils % 71 32 - 75 %    Lymphocytes % 21 12 - 49 %    Monocytes % 5 5 - 13 %    Eosinophils % 1 0 - 7 %    Basophils % 1 0 - 1 %    Immature Granulocytes 1 (H) 0.0 - 0.5 %    Neutrophils Absolute 6.9 1.8 - 8.0 K/UL    Lymphocytes Absolute 2.0 0.8 - 3.5 K/UL    Monocytes Absolute 0.5 0.0 - 1.0 K/UL    Eosinophils Absolute 0.1 0.0 - 0.4 K/UL    Basophils Absolute 0.1 0.0 - 0.1 K/UL    Absolute Immature Granulocyte 0.1 (H) 0.00 - 0.04 K/UL    Differential Type AUTOMATED     Comprehensive Metabolic Panel    Collection Time: 06/22/23 11:57 AM   Result Value Ref Range    Sodium 137 136 - 145 mmol/L    Potassium 4.8 3.5 - 5.1 mmol/L    Chloride 109 (H) 97 - 108 mmol/L

## 2023-06-23 ENCOUNTER — TELEPHONE (OUTPATIENT)
Age: 48
End: 2023-06-23

## 2023-06-23 LAB
EKG ATRIAL RATE: 96 BPM
EKG DIAGNOSIS: NORMAL
EKG P AXIS: 74 DEGREES
EKG P-R INTERVAL: 138 MS
EKG Q-T INTERVAL: 356 MS
EKG QRS DURATION: 84 MS
EKG QTC CALCULATION (BAZETT): 449 MS
EKG R AXIS: 76 DEGREES
EKG T AXIS: 66 DEGREES
EKG VENTRICULAR RATE: 96 BPM

## 2023-06-23 NOTE — TELEPHONE ENCOUNTER
Ana Slater w/ [facility]  [phone]    -Called pt's PCP to inform the pt's provider that PA for glucose meter was denied  -Pt's provider needs to do a peer to peer for glucose meter  -This should be done if possible before the 06/29/2023  -Please reach out to [facility] @ [phone]    Thank You

## 2023-06-26 ENCOUNTER — TELEMEDICINE (OUTPATIENT)
Age: 48
End: 2023-06-26
Payer: MEDICAID

## 2023-06-26 ENCOUNTER — CLINICAL DOCUMENTATION (OUTPATIENT)
Age: 48
End: 2023-06-26

## 2023-06-26 DIAGNOSIS — E66.01 SEVERE OBESITY (BMI >= 40) (HCC): ICD-10-CM

## 2023-06-26 DIAGNOSIS — E11.69 TYPE 2 DIABETES MELLITUS WITH OTHER SPECIFIED COMPLICATION, WITH LONG-TERM CURRENT USE OF INSULIN (HCC): ICD-10-CM

## 2023-06-26 DIAGNOSIS — G47.00 INSOMNIA, UNSPECIFIED TYPE: ICD-10-CM

## 2023-06-26 DIAGNOSIS — G47.33 OBSTRUCTIVE SLEEP APNEA (ADULT) (PEDIATRIC): Primary | ICD-10-CM

## 2023-06-26 DIAGNOSIS — Z79.4 TYPE 2 DIABETES MELLITUS WITH OTHER SPECIFIED COMPLICATION, WITH LONG-TERM CURRENT USE OF INSULIN (HCC): ICD-10-CM

## 2023-06-26 PROCEDURE — 99204 OFFICE O/P NEW MOD 45 MIN: CPT | Performed by: INTERNAL MEDICINE

## 2023-06-26 PROCEDURE — 3046F HEMOGLOBIN A1C LEVEL >9.0%: CPT | Performed by: INTERNAL MEDICINE

## 2023-06-26 ASSESSMENT — SLEEP AND FATIGUE QUESTIONNAIRES
HOW LIKELY ARE YOU TO NOD OFF OR FALL ASLEEP IN A CAR, WHILE STOPPED FOR A FEW MINUTES IN TRAFFIC: 0
AVERAGE NUMBER OF SLEEP HOURS: 3
HOW LIKELY ARE YOU TO NOD OFF OR FALL ASLEEP WHEN YOU ARE A PASSENGER IN A CAR FOR AN HOUR WITHOUT A BREAK: SLIGHT CHANCE OF DOZING
HOW LIKELY ARE YOU TO NOD OFF OR FALL ASLEEP WHILE SITTING AND READING: 3
SELECT ANY OF THE FOLLOWING BEHAVIORS OBSERVED WHILE YOU ARE ASLEEP: TWITCHING OF LEGS OR FEET
HOW LIKELY ARE YOU TO NOD OFF OR FALL ASLEEP WHILE SITTING AND TALKING TO SOMEONE: SLIGHT CHANCE OF DOZING
ESS TOTAL SCORE: 15
SELECT ANY OF THE FOLLOWING BEHAVIORS OBSERVED WHILE YOU ARE ASLEEP: SLEEP TALKING
ARE YOU BOTHERED BY WAKING UP TOO EARLY AND NOT BEING ABLE TO GET BACK TO SLEEP: YES
DO YOU GET TOO LITTLE SLEEP AT NIGHT: DOES
DO YOU HAVE DIFFICULTY OPERATING A MOTOR VEHICLE FOR LONG DISTANCES (GREATER THAN 100 MILES) BECAUSE YOU BECOME SLEEPY: YES, MODERATE
HOW LIKELY ARE YOU TO NOD OFF OR FALL ASLEEP WHEN YOU ARE A PASSENGER IN A CAR FOR AN HOUR WITHOUT A BREAK: 1
DO YOU HAVE DIFFICULTY CONCENTRATING ON THE THINGS YOU DO BECAUSE YOU ARE SLEEPY OR TIRED: YES, EXTREME
AVERAGE NUMBER OF SLEEP HOURS: 3
DO YOU GENERALLY HAVE DIFFICULTY REMEMBERING THINGS BECAUSE YOU ARE SLEEPY OR TIRED: YES, MODERATE
DO YOU HAVE DIFFICULTY VISITING YOUR FAMILY OR FRIENDS IN THEIR HOME BECAUSE YOU BECOME SLEEPY OR TIRED: NO
HOW LIKELY ARE YOU TO NOD OFF OR FALL ASLEEP WHILE SITTING QUIETLY AFTER LUNCH WITHOUT ALCOHOL: HIGH CHANCE OF DOZING
HAS YOUR RELATIONSHIP WITH FAMILY, FRIENDS OR WORK COLLEAGUES BEEN AFFECTED BECAUSE YOU ARE SLEEPY OR TIRED: YES, MODERATE
DO YOU TAKE NAPS: YES
HOW LIKELY ARE YOU TO NOD OFF OR FALL ASLEEP WHILE SITTING QUIETLY AFTER LUNCH WITHOUT ALCOHOL: 3
HOW LONG DO YOU NAP: 45 MINUTES TO 1 HOUR
SELECT ANY OF THE FOLLOWING BEHAVIORS OBSERVED WHILE YOU ARE ASLEEP: LOUD SNORING
NUMBER OF TIMES YOU WAKE PER NIGHT: 3
HOW LIKELY ARE YOU TO NOD OFF OR FALL ASLEEP WHILE LYING DOWN TO REST IN THE AFTERNOON WHEN CIRCUMSTANCES PERMIT: 3
DO YOU HAVE DIFFICULTY WATCHING A MOVIE OR VIDEO BECAUSE YOU BECOME SLEEPY OR TIRED: YES, A LITTLE
HOW LIKELY ARE YOU TO NOD OFF OR FALL ASLEEP WHILE SITTING INACTIVE IN A PUBLIC PLACE: MODERATE CHANCE OF DOZING
HOW LIKELY ARE YOU TO NOD OFF OR FALL ASLEEP WHILE SITTING INACTIVE IN A PUBLIC PLACE: 2
HOW LIKELY ARE YOU TO NOD OFF OR FALL ASLEEP WHILE LYING DOWN TO REST IN THE AFTERNOON WHEN CIRCUMSTANCES PERMIT: HIGH CHANCE OF DOZING
HAS YOUR MOOD BEEN AFFECTED BECAUSE YOU ARE SLEEPY OR TIRED: YES, LITTLE
HOW LIKELY ARE YOU TO NOD OFF OR FALL ASLEEP WHILE SITTING AND TALKING TO SOMEONE: 1
SELECT ANY OF THE FOLLOWING BEHAVIORS OBSERVED WHILE YOU ARE ASLEEP: LIGHT SNORING
HOW MANY NAPS DO YOU TAKE PER WEEK: 3
DO YOU HAVE DIFFICULTY BEING AS ACTIVE AS YOU WANT TO BE IN THE MORNING BECAUSE YOU ARE SLEEPY OR TIRED: YES, MODERATE
HOW LIKELY ARE YOU TO NOD OFF OR FALL ASLEEP IN A CAR, WHILE STOPPED FOR A FEW MINUTES IN TRAFFIC: WOULD NEVER DOZE
HOW LIKELY ARE YOU TO NOD OFF OR FALL ASLEEP WHILE SITTING AND READING: HIGH CHANCE OF DOZING
HOW LIKELY ARE YOU TO NOD OFF OR FALL ASLEEP WHILE WATCHING TV: MODERATE CHANCE OF DOZING
DO YOU WORK SHIFTS: NO
HOW LIKELY ARE YOU TO NOD OFF OR FALL ASLEEP WHILE WATCHING TV: 2
ARE YOU BOTHERED BY WAKING UP TOO EARLY AND NOT BEING ABLE TO GET BACK TO SLEEP: IS
DO YOU HAVE PROBLEMS WITH FREQUENT AWAKENINGS AT NIGHT: YES
DO YOU HAVE DIFFICULTY BEING AS ACTIVE AS YOU WANT TO BE IN THE EVENING BECAUSE YOU ARE SLEEPY OR TIRED: YES, LITTLE
DO YOU HAVE DIFFICULTY OPERATING A MOTOR VEHICLE FOR SHORT DISTANCES (LESS THAN 100 MILES) BECAUSE YOU BECOME SLEEPY: YES, A LITTLE
DO YOU GET TOO LITTLE SLEEP AT NIGHT: YES
FOSQ SCORE: 12.5

## 2023-07-07 ENCOUNTER — TELEPHONE (OUTPATIENT)
Age: 48
End: 2023-07-07

## 2023-07-07 NOTE — TELEPHONE ENCOUNTER
Jaky with [address] is calling to see if a Peer to Peer has been done    [phone] (fax) Niacinamide Pregnancy And Lactation Text: These medications are considered safe during pregnancy.

## 2023-07-11 NOTE — TELEPHONE ENCOUNTER
Spoke with [address]. They said peer to peer is for the free style netta. States provider needs to call the [employer] (phone number in previous TE) for appeal process.

## 2023-07-19 DIAGNOSIS — F41.9 ANXIETY DISORDER, UNSPECIFIED TYPE: ICD-10-CM

## 2023-07-21 RX ORDER — ALPRAZOLAM 1 MG/1
TABLET ORAL
Qty: 30 TABLET | Refills: 0 | Status: SHIPPED | OUTPATIENT
Start: 2023-07-21 | End: 2023-08-20

## 2023-08-04 ENCOUNTER — TELEPHONE (OUTPATIENT)
Age: 48
End: 2023-08-04

## 2023-08-04 NOTE — TELEPHONE ENCOUNTER
vijay with [address] [place] [address] delivered is calling is to following up on her denial of her Free Port Ale

## 2023-08-10 DIAGNOSIS — F41.9 ANXIETY DISORDER, UNSPECIFIED TYPE: ICD-10-CM

## 2023-08-18 RX ORDER — ALPRAZOLAM 1 MG/1
TABLET ORAL
Qty: 30 TABLET | Refills: 0 | Status: SHIPPED | OUTPATIENT
Start: 2023-08-18 | End: 2023-09-17

## 2023-08-30 ENCOUNTER — OFFICE VISIT (OUTPATIENT)
Age: 48
End: 2023-08-30
Payer: MEDICAID

## 2023-08-30 VITALS
BODY MASS INDEX: 46.03 KG/M2 | DIASTOLIC BLOOD PRESSURE: 81 MMHG | TEMPERATURE: 97.6 F | HEART RATE: 105 BPM | RESPIRATION RATE: 18 BRPM | OXYGEN SATURATION: 97 % | HEIGHT: 61 IN | SYSTOLIC BLOOD PRESSURE: 120 MMHG | WEIGHT: 243.8 LBS

## 2023-08-30 DIAGNOSIS — E11.9 TYPE 2 DIABETES MELLITUS WITHOUT COMPLICATION, WITH LONG-TERM CURRENT USE OF INSULIN (HCC): Primary | ICD-10-CM

## 2023-08-30 DIAGNOSIS — Z79.4 LONG TERM (CURRENT) USE OF INSULIN (HCC): ICD-10-CM

## 2023-08-30 DIAGNOSIS — F41.9 ANXIETY DISORDER, UNSPECIFIED TYPE: ICD-10-CM

## 2023-08-30 DIAGNOSIS — R00.0 TACHYCARDIA: ICD-10-CM

## 2023-08-30 DIAGNOSIS — Z79.4 TYPE 2 DIABETES MELLITUS WITHOUT COMPLICATION, WITH LONG-TERM CURRENT USE OF INSULIN (HCC): Primary | ICD-10-CM

## 2023-08-30 LAB
ALBUMIN, URINE, POC: 30 MG/L
CREATININE, URINE, POC: 300 MG/DL
MICROALB/CREAT RATIO, POC: <30 MG/G

## 2023-08-30 PROCEDURE — 99214 OFFICE O/P EST MOD 30 MIN: CPT | Performed by: NURSE PRACTITIONER

## 2023-08-30 PROCEDURE — 82044 UR ALBUMIN SEMIQUANTITATIVE: CPT | Performed by: NURSE PRACTITIONER

## 2023-08-30 PROCEDURE — 3046F HEMOGLOBIN A1C LEVEL >9.0%: CPT | Performed by: NURSE PRACTITIONER

## 2023-08-30 PROCEDURE — PBSHW AMB POC URINE, MICROALBUMIN, SEMIQUANT (3 RESULTS): Performed by: NURSE PRACTITIONER

## 2023-08-30 RX ORDER — INSULIN GLARGINE 100 [IU]/ML
45 INJECTION, SOLUTION SUBCUTANEOUS NIGHTLY
Qty: 5 ADJUSTABLE DOSE PRE-FILLED PEN SYRINGE | Refills: 11 | Status: SHIPPED | OUTPATIENT
Start: 2023-08-30

## 2023-08-30 RX ORDER — BUSPIRONE HYDROCHLORIDE 10 MG/1
TABLET ORAL
COMMUNITY
Start: 2023-08-17

## 2023-08-30 RX ORDER — BUPROPION HYDROCHLORIDE 150 MG/1
150 TABLET ORAL EVERY MORNING
COMMUNITY
Start: 2023-08-17

## 2023-08-30 SDOH — ECONOMIC STABILITY: FOOD INSECURITY: WITHIN THE PAST 12 MONTHS, THE FOOD YOU BOUGHT JUST DIDN'T LAST AND YOU DIDN'T HAVE MONEY TO GET MORE.: NEVER TRUE

## 2023-08-30 SDOH — ECONOMIC STABILITY: FOOD INSECURITY: WITHIN THE PAST 12 MONTHS, YOU WORRIED THAT YOUR FOOD WOULD RUN OUT BEFORE YOU GOT MONEY TO BUY MORE.: NEVER TRUE

## 2023-08-30 SDOH — ECONOMIC STABILITY: INCOME INSECURITY: HOW HARD IS IT FOR YOU TO PAY FOR THE VERY BASICS LIKE FOOD, HOUSING, MEDICAL CARE, AND HEATING?: NOT HARD AT ALL

## 2023-08-30 ASSESSMENT — PATIENT HEALTH QUESTIONNAIRE - PHQ9
1. LITTLE INTEREST OR PLEASURE IN DOING THINGS: 0
SUM OF ALL RESPONSES TO PHQ QUESTIONS 1-9: 0
2. FEELING DOWN, DEPRESSED OR HOPELESS: 0
SUM OF ALL RESPONSES TO PHQ QUESTIONS 1-9: 0
SUM OF ALL RESPONSES TO PHQ9 QUESTIONS 1 & 2: 0

## 2023-08-30 NOTE — PROGRESS NOTES
Chief Complaint   Patient presents with    Follow-up    Diabetes         S: Gretchen Gibson is a 49 y.o. yo female who presents for DM follow up. Last DM check hemoglobin A1c was 10.2 on 4/27/23  Blood sugars  30 day average 142  14 day average 151  7 day average 143  Has had low blood sugars in middle of the night in 60s (8 events)  She notes she cut her lantus dose to 50 units about a month ago  Has only been taking metformin about every other day  Only taking actos as needed for high blood sugars and has taken glimepiride daily. Diet and Exercise-working on diet a lot. Has lost 12 lbs since last visit. She admits she does drink a good amount of caffeine. She has seen psych now  Started on wellbutrin and buspar  Taking xanax usually every night with the doxepin to help her relax and sleep. HTN  Hypertension:  Home blood pressure monitoring: No.  She is adherent to a low sodium diet. Patient denies chest pain, shortness of breath, headache, lightheadedness, blurred vision, and peripheral edema. Antihypertensive medication side effects: no medication side effects noted. Health Maintenance Reviewed-colonoscopy coming up 9/6/23    Denies cardiac complaints including chest pain or discomfort, elevated heart rate, or palpitations. Denies any headache, vision changes, numbness and tingling or weakness in her extremities. Denies respiratory complaints including SOB, difficulty or pain with breathing, wheezes, and cough. Feels well and ROS is otherwise negative.      Past Medical History:   Diagnosis Date    Arthritis     Chronic pain     arthritis    COVID-19 04/14/2021    Depression     DM type 2 (diabetes mellitus, type 2) ([facility]) 6/12/2009    GERD (gastroesophageal reflux disease)     mostly before gastric bypass/has again now    H/O gastric bypass     Ill-defined condition     anastomatic stricture    Morbid obesity (HCC)     Nausea & vomiting     with anesthesia    Non-nicotine vapor

## 2023-08-30 NOTE — PROGRESS NOTES
Chief Complaint   Patient presents with    Follow-up    Diabetes         Health Maintenance Due   Topic Date Due    Diabetic foot exam  Never done    HIV screen  Never done    Diabetic Alb to Cr ratio (uACR) test  Never done    Diabetic retinal exam  Never done    Colorectal Cancer Screen  Never done    Hepatitis B vaccine (2 of 3 - Risk 3-dose series) 03/26/2023    A1C test (Diabetic or Prediabetic)  07/27/2023    Flu vaccine (1) 08/01/2023           1. \"Have you been to the ER, urgent care clinic since your last visit? Hospitalized since your last visit? \" Er in June for Chest pains    2. \"Have you seen or consulted any other health care providers outside of the [address] [place] [address] since your last visit? \" Neurology     3. For patients aged 43-73: Has the patient had a colonoscopy / FIT/ Cologuard? Yes - Care Gap present. Most recent result on file      If the patient is female:    4. For patients aged 43-66: Has the patient had a mammogram within the past 2 years? Yes - Care Gap present. Most recent result on file      5. For patients aged 21-65: Has the patient had a pap smear? Yes - Care Gap present.  Most recent result on file

## 2023-08-31 LAB
ALBUMIN SERPL-MCNC: 4.2 G/DL (ref 3.9–4.9)
ALBUMIN/GLOB SERPL: 1.4 {RATIO} (ref 1.2–2.2)
ALP SERPL-CCNC: 179 IU/L (ref 44–121)
ALT SERPL-CCNC: 10 IU/L (ref 0–32)
AST SERPL-CCNC: 16 IU/L (ref 0–40)
BASOPHILS # BLD AUTO: 0.1 X10E3/UL (ref 0–0.2)
BASOPHILS NFR BLD AUTO: 1 %
BILIRUB SERPL-MCNC: 0.2 MG/DL (ref 0–1.2)
BUN SERPL-MCNC: 7 MG/DL (ref 6–24)
BUN/CREAT SERPL: 8 (ref 9–23)
CALCIUM SERPL-MCNC: 9.6 MG/DL (ref 8.7–10.2)
CHLORIDE SERPL-SCNC: 104 MMOL/L (ref 96–106)
CHOLEST SERPL-MCNC: 163 MG/DL (ref 100–199)
CO2 SERPL-SCNC: 20 MMOL/L (ref 20–29)
CREAT SERPL-MCNC: 0.87 MG/DL (ref 0.57–1)
EGFRCR SERPLBLD CKD-EPI 2021: 82 ML/MIN/1.73
EOSINOPHIL # BLD AUTO: 0.2 X10E3/UL (ref 0–0.4)
EOSINOPHIL NFR BLD AUTO: 2 %
ERYTHROCYTE [DISTWIDTH] IN BLOOD BY AUTOMATED COUNT: 14.4 % (ref 11.7–15.4)
GLOBULIN SER CALC-MCNC: 3.1 G/DL (ref 1.5–4.5)
GLUCOSE SERPL-MCNC: 97 MG/DL (ref 70–99)
HBA1C MFR BLD: 7.4 % (ref 4.8–5.6)
HCT VFR BLD AUTO: 49.9 % (ref 34–46.6)
HDLC SERPL-MCNC: 41 MG/DL
HGB BLD-MCNC: 16.8 G/DL (ref 11.1–15.9)
IMM GRANULOCYTES # BLD AUTO: 0 X10E3/UL (ref 0–0.1)
IMM GRANULOCYTES NFR BLD AUTO: 0 %
IMP & REVIEW OF LAB RESULTS: NORMAL
LDLC SERPL CALC-MCNC: 98 MG/DL (ref 0–99)
LYMPHOCYTES # BLD AUTO: 2.9 X10E3/UL (ref 0.7–3.1)
LYMPHOCYTES NFR BLD AUTO: 25 %
MCH RBC QN AUTO: 30.8 PG (ref 26.6–33)
MCHC RBC AUTO-ENTMCNC: 33.7 G/DL (ref 31.5–35.7)
MCV RBC AUTO: 92 FL (ref 79–97)
MONOCYTES # BLD AUTO: 0.6 X10E3/UL (ref 0.1–0.9)
MONOCYTES NFR BLD AUTO: 5 %
NEUTROPHILS # BLD AUTO: 7.9 X10E3/UL (ref 1.4–7)
NEUTROPHILS NFR BLD AUTO: 67 %
PLATELET # BLD AUTO: 360 X10E3/UL (ref 150–450)
POTASSIUM SERPL-SCNC: 5 MMOL/L (ref 3.5–5.2)
PROT SERPL-MCNC: 7.3 G/DL (ref 6–8.5)
RBC # BLD AUTO: 5.45 X10E6/UL (ref 3.77–5.28)
SODIUM SERPL-SCNC: 144 MMOL/L (ref 134–144)
TRIGL SERPL-MCNC: 136 MG/DL (ref 0–149)
VLDLC SERPL CALC-MCNC: 24 MG/DL (ref 5–40)
WBC # BLD AUTO: 11.7 X10E3/UL (ref 3.4–10.8)

## 2023-09-27 DIAGNOSIS — F41.9 ANXIETY DISORDER, UNSPECIFIED TYPE: ICD-10-CM

## 2023-09-28 RX ORDER — ALPRAZOLAM 1 MG/1
TABLET ORAL
Qty: 30 TABLET | Refills: 0 | Status: SHIPPED | OUTPATIENT
Start: 2023-09-28 | End: 2023-10-28

## 2023-09-28 NOTE — TELEPHONE ENCOUNTER
verified. Pt states her psychiatrist will not prescribed medication. Pt states her psychiatrist has left the practice and she is currently waiting to get in with new one.

## 2023-10-07 DIAGNOSIS — G44.89 OTHER HEADACHE SYNDROME: ICD-10-CM

## 2023-10-09 RX ORDER — BUTALBITAL, ACETAMINOPHEN AND CAFFEINE 50; 325; 40 MG/1; MG/1; MG/1
TABLET ORAL
Qty: 30 TABLET | Refills: 0 | Status: SHIPPED | OUTPATIENT
Start: 2023-10-09

## 2023-10-26 DIAGNOSIS — F41.9 ANXIETY DISORDER, UNSPECIFIED TYPE: ICD-10-CM

## 2023-10-27 RX ORDER — ALPRAZOLAM 1 MG/1
TABLET ORAL
Qty: 30 TABLET | Refills: 0 | Status: SHIPPED | OUTPATIENT
Start: 2023-10-27 | End: 2023-11-26

## 2023-11-01 ENCOUNTER — OFFICE VISIT (OUTPATIENT)
Age: 48
End: 2023-11-01
Payer: COMMERCIAL

## 2023-11-01 VITALS
OXYGEN SATURATION: 98 % | WEIGHT: 231.2 LBS | TEMPERATURE: 98 F | HEIGHT: 61 IN | DIASTOLIC BLOOD PRESSURE: 87 MMHG | HEART RATE: 105 BPM | RESPIRATION RATE: 16 BRPM | SYSTOLIC BLOOD PRESSURE: 121 MMHG | BODY MASS INDEX: 43.65 KG/M2

## 2023-11-01 DIAGNOSIS — E11.9 TYPE 2 DIABETES MELLITUS WITHOUT COMPLICATION, WITH LONG-TERM CURRENT USE OF INSULIN (HCC): Primary | ICD-10-CM

## 2023-11-01 DIAGNOSIS — F51.01 PRIMARY INSOMNIA: ICD-10-CM

## 2023-11-01 DIAGNOSIS — Z79.4 TYPE 2 DIABETES MELLITUS WITHOUT COMPLICATION, WITH LONG-TERM CURRENT USE OF INSULIN (HCC): Primary | ICD-10-CM

## 2023-11-01 DIAGNOSIS — F32.A DEPRESSION, UNSPECIFIED DEPRESSION TYPE: ICD-10-CM

## 2023-11-01 DIAGNOSIS — R00.0 TACHYCARDIA: ICD-10-CM

## 2023-11-01 DIAGNOSIS — F41.9 ANXIETY DISORDER, UNSPECIFIED TYPE: ICD-10-CM

## 2023-11-01 DIAGNOSIS — E55.9 VITAMIN D DEFICIENCY: ICD-10-CM

## 2023-11-01 PROCEDURE — 99214 OFFICE O/P EST MOD 30 MIN: CPT | Performed by: NURSE PRACTITIONER

## 2023-11-01 PROCEDURE — 3051F HG A1C>EQUAL 7.0%<8.0%: CPT | Performed by: NURSE PRACTITIONER

## 2023-11-01 RX ORDER — PANTOPRAZOLE SODIUM 40 MG/1
40 TABLET, DELAYED RELEASE ORAL
COMMUNITY
Start: 2023-09-02

## 2023-11-01 RX ORDER — GLIMEPIRIDE 4 MG/1
4 TABLET ORAL
Qty: 90 TABLET | Refills: 3 | Status: SHIPPED | OUTPATIENT
Start: 2023-11-01

## 2023-11-01 RX ORDER — METOPROLOL TARTRATE 50 MG/1
50 TABLET, FILM COATED ORAL
Qty: 90 TABLET | Refills: 3 | Status: SHIPPED | OUTPATIENT
Start: 2023-11-01

## 2023-11-01 RX ORDER — BUPROPION HYDROCHLORIDE 300 MG/1
300 TABLET ORAL EVERY MORNING
Qty: 90 TABLET | Refills: 3 | Status: SHIPPED | OUTPATIENT
Start: 2023-11-01

## 2023-11-01 RX ORDER — BUSPIRONE HYDROCHLORIDE 15 MG/1
15 TABLET ORAL 3 TIMES DAILY
COMMUNITY
Start: 2023-09-13

## 2023-11-01 RX ORDER — ERGOCALCIFEROL 1.25 MG/1
50000 CAPSULE ORAL
Qty: 4 CAPSULE | Refills: 3 | Status: SHIPPED | OUTPATIENT
Start: 2023-11-01

## 2023-11-01 RX ORDER — DOXEPIN HYDROCHLORIDE 25 MG/1
25 CAPSULE ORAL NIGHTLY
Qty: 90 CAPSULE | Refills: 3 | Status: SHIPPED | OUTPATIENT
Start: 2023-11-01

## 2023-11-01 SDOH — ECONOMIC STABILITY: FOOD INSECURITY: WITHIN THE PAST 12 MONTHS, THE FOOD YOU BOUGHT JUST DIDN'T LAST AND YOU DIDN'T HAVE MONEY TO GET MORE.: NEVER TRUE

## 2023-11-01 SDOH — ECONOMIC STABILITY: INCOME INSECURITY: HOW HARD IS IT FOR YOU TO PAY FOR THE VERY BASICS LIKE FOOD, HOUSING, MEDICAL CARE, AND HEATING?: NOT HARD AT ALL

## 2023-11-01 SDOH — ECONOMIC STABILITY: FOOD INSECURITY: WITHIN THE PAST 12 MONTHS, YOU WORRIED THAT YOUR FOOD WOULD RUN OUT BEFORE YOU GOT MONEY TO BUY MORE.: NEVER TRUE

## 2023-11-01 ASSESSMENT — PATIENT HEALTH QUESTIONNAIRE - PHQ9
2. FEELING DOWN, DEPRESSED OR HOPELESS: 0
SUM OF ALL RESPONSES TO PHQ QUESTIONS 1-9: 0
2. FEELING DOWN, DEPRESSED OR HOPELESS: 0
SUM OF ALL RESPONSES TO PHQ QUESTIONS 1-9: 0
1. LITTLE INTEREST OR PLEASURE IN DOING THINGS: 0
SUM OF ALL RESPONSES TO PHQ QUESTIONS 1-9: 0
1. LITTLE INTEREST OR PLEASURE IN DOING THINGS: 0
SUM OF ALL RESPONSES TO PHQ QUESTIONS 1-9: 0
SUM OF ALL RESPONSES TO PHQ9 QUESTIONS 1 & 2: 0
SUM OF ALL RESPONSES TO PHQ QUESTIONS 1-9: 0
SUM OF ALL RESPONSES TO PHQ9 QUESTIONS 1 & 2: 0

## 2023-11-10 ENCOUNTER — TELEPHONE (OUTPATIENT)
Age: 48
End: 2023-11-10

## 2023-11-10 NOTE — TELEPHONE ENCOUNTER
Pt is requesting a PA for Continuous Blood Gluc Sensor (FREESTYLE NAREN 2 SENSOR) [facility] Yes

## 2023-11-14 ENCOUNTER — TELEPHONE (OUTPATIENT)
Age: 48
End: 2023-11-14

## 2023-11-14 NOTE — TELEPHONE ENCOUNTER
Please note : PA for [employer] PA has been initiated with DIVYA Jordan. Ref: # PA [id number]. Please submit chart notes and supporting document to fax # [phone],LM soon as possible to help with review outcome that has been started by review team,turn around time is within 24 hours. when faxing please include Ref: # with additional,information noted on fax documents. thanks.

## 2023-11-24 DIAGNOSIS — F41.9 ANXIETY DISORDER, UNSPECIFIED TYPE: ICD-10-CM

## 2023-11-24 DIAGNOSIS — G44.89 OTHER HEADACHE SYNDROME: ICD-10-CM

## 2023-11-24 RX ORDER — BUTALBITAL, ACETAMINOPHEN AND CAFFEINE 50; 325; 40 MG/1; MG/1; MG/1
TABLET ORAL
Qty: 30 TABLET | Refills: 0 | Status: SHIPPED | OUTPATIENT
Start: 2023-11-24

## 2023-11-24 RX ORDER — ALPRAZOLAM 1 MG/1
TABLET ORAL
Qty: 30 TABLET | Refills: 0 | Status: SHIPPED | OUTPATIENT
Start: 2023-11-24 | End: 2023-12-24

## 2023-12-29 DIAGNOSIS — F41.9 ANXIETY DISORDER, UNSPECIFIED TYPE: ICD-10-CM

## 2023-12-29 RX ORDER — ALPRAZOLAM 1 MG/1
TABLET ORAL
Qty: 30 TABLET | Refills: 0 | Status: SHIPPED | OUTPATIENT
Start: 2023-12-29 | End: 2024-01-29

## 2023-12-29 NOTE — TELEPHONE ENCOUNTER
I Spoke with Pharmacist. Pt is the process of moving and is requesting a new refill of her Xanax that has  sent to PA.

## 2024-01-16 ENCOUNTER — TELEPHONE (OUTPATIENT)
Age: 49
End: 2024-01-16

## 2024-01-16 NOTE — TELEPHONE ENCOUNTER
Spoke to pt and informed her that immunization report was faxed.       ----- Message from Yumiko He sent at 1/16/2024  1:14 PM EST -----  Subject: Message to Provider    QUESTIONS  Information for Provider? Patient needs her immunization records faxed to   a potential employer to fax # [phone]. Patient needs this completed   for on boarding by 1/17/2024.   ---------------------------------------------------------------------------  --------------  CALL BACK INFO  [phone]; OK to leave message on voicemail  ---------------------------------------------------------------------------  --------------  SCRIPT ANSWERS  Relationship to Patient? Self

## 2024-02-19 DIAGNOSIS — F41.9 ANXIETY DISORDER, UNSPECIFIED TYPE: ICD-10-CM

## 2024-02-21 RX ORDER — ALPRAZOLAM 1 MG/1
TABLET ORAL
Qty: 30 TABLET | Refills: 0 | Status: SHIPPED | OUTPATIENT
Start: 2024-02-21 | End: 2024-03-21

## 2024-02-21 NOTE — TELEPHONE ENCOUNTER
Refilled Xanax.  Can refill 1 more time in March.  After that would need to get further refills from new PCP

## 2024-02-22 NOTE — TELEPHONE ENCOUNTER
verified. Informed pt of message from provider regarding medication. Pt verified understanding and had no further questions.

## (undated) DEVICE — ENDO CARRY-ON PROCEDURE KIT INCLUDES ENZYMATIC SPONGE, GAUZE, BIOHAZARD LABEL, TRAY, LUBRICANT, DIRTY SCOPE LABEL, WATER LABEL, TRAY, DRAWSTRING PAD, AND DEFENDO 4-PIECE KIT.: Brand: ENDO CARRY-ON PROCEDURE KIT

## (undated) DEVICE — CANN NASAL O2 CAPNOGRAPHY AD -- FILTERLINE

## (undated) DEVICE — Z DISCONTINUED NO SUB IDED SET EXTN W/ 4 W STPCOCK M SPIN LOK 36IN

## (undated) DEVICE — BW-412T DISP COMBO CLEANING BRUSH: Brand: SINGLE USE COMBINATION CLEANING BRUSH

## (undated) DEVICE — SOL IRR SOD CL 0.9% 1000ML BTL --

## (undated) DEVICE — 1200 GUARD II KIT W/5MM TUBE W/O VAC TUBE: Brand: GUARDIAN

## (undated) DEVICE — PREP SKN CHLRAPRP APL 26ML STR --

## (undated) DEVICE — SET EXTN TBNG L BOR 4 W STPCOCK ST 32IN PRIMING VOL 6ML

## (undated) DEVICE — PADDING CST CRMPD 3INX4YD NS --

## (undated) DEVICE — ESOPHAGEAL/PYLORIC/COLONIC/BILIARY WIREGUIDED BALLOON DILATATION CATHETER: Brand: CRE™ PRO

## (undated) DEVICE — SET ADMIN 16ML TBNG L100IN 2 Y INJ SITE IV PIGGY BK DISP

## (undated) DEVICE — SOLIDIFIER FLUID 3000 CC ABSORB

## (undated) DEVICE — BIPOLAR FORCEPS CORD: Brand: VALLEYLAB

## (undated) DEVICE — ZIMMER® STERILE DISPOSABLE TOURNIQUET CUFF WITH PLC, DUAL PORT, SINGLE BLADDER, 18 IN. (46 CM)

## (undated) DEVICE — SUTURE VCRL SZ 3-0 L27IN ABSRB UD L26MM SH 1/2 CIR J416H

## (undated) DEVICE — KENDALL RADIOLUCENT FOAM MONITORING ELECTRODE -RECTANGULAR SHAPE: Brand: KENDALL

## (undated) DEVICE — SUTURE ETHBND EXCEL SZ 3-0 L36IN NONABSORBABLE GRN RB-1 X558H

## (undated) DEVICE — TUBING, SUCTION, 1/4" X 10', STRAIGHT: Brand: MEDLINE

## (undated) DEVICE — MASTISOL ADHESIVE LIQ 2/3ML

## (undated) DEVICE — CONTINU-FLO SOLUTION SET, 2 INJECTION SITES, MALE LUER LOCK ADAPTER WITH RETRACTABLE COLLAR, LARGE BORE STOPCOCK WITH ROTATING MALE LUER LOCK EXTENSION SET, 2 INJECTION SITES, MALE LUER LOCK ADAPTER WITH RETRACTABLE COLLAR: Brand: INTERLINK/CONTINU-FLO

## (undated) DEVICE — NEEDLE HYPO 18GA L1.5IN PNK S STL HUB POLYPR SHLD REG BVL

## (undated) DEVICE — CATH IV AUTOGRD BC BLU 22GA 25 -- INSYTE

## (undated) DEVICE — 3M™ TEGADERM™ TRANSPARENT FILM DRESSING FRAME STYLE, 1624W, 2-3/8 IN X 2-3/4 IN (6 CM X 7 CM), 100/CT 4CT/CASE: Brand: 3M™ TEGADERM™

## (undated) DEVICE — KIT IV STRT W CHLORAPREP PD 1ML

## (undated) DEVICE — SUTURE MCRYL SZ 4-0 L27IN ABSRB UD L19MM PS-2 1/2 CIR PRIM Y426H

## (undated) DEVICE — BAG BELONG PT PERS CLEAR HANDL

## (undated) DEVICE — SYR ASSEMB INFL BLLN 60ML --

## (undated) DEVICE — BANDAGE,GAUZE,BULKEE II,2.25"X3YD,STRL: Brand: MEDLINE

## (undated) DEVICE — INTENDED FOR TISSUE SEPARATION, AND OTHER PROCEDURES THAT REQUIRE A SHARP SURGICAL BLADE TO PUNCTURE OR CUT.: Brand: BARD-PARKER ® CARBON RIB-BACK BLADES

## (undated) DEVICE — BLADE OPHTH MINI BEAV SHRP --

## (undated) DEVICE — SUT ETHLN 3-0 18IN PS2 BLK --

## (undated) DEVICE — C-ARM: Brand: UNBRANDED

## (undated) DEVICE — STERILE POLYISOPRENE POWDER-FREE SURGICAL GLOVES WITH EMOLLIENT COATING: Brand: PROTEXIS

## (undated) DEVICE — STRIP,CLOSURE,WOUND,MEDI-STRIP,1/4X3: Brand: MEDLINE

## (undated) DEVICE — SYRINGE MED 20ML STD CLR PLAS LUERLOCK TIP N CTRL DISP

## (undated) DEVICE — SUTURE FIBERWIRE SZ 2 W/ TAPERED NEEDLE BLUE L38IN NONABSORB BLU L26.5MM 1/2 CIRCLE AR7200

## (undated) DEVICE — DRESSING,GAUZE,XEROFORM,CURAD,1"X8",ST: Brand: CURAD

## (undated) DEVICE — HAND I-LF: Brand: MEDLINE INDUSTRIES, INC.

## (undated) DEVICE — BNDG ELAS HK LOOP 2X5YD NS -- MATRIX

## (undated) DEVICE — COVER LT HNDL PLAS RIG 1 PER PK

## (undated) DEVICE — STERILE POLYISOPRENE POWDER-FREE SURGICAL GLOVES: Brand: PROTEXIS

## (undated) DEVICE — Z DISCONTINUED NO SUB IDED BUCKET CAST INF CLAV STRP WHT BCKL CLSR PREM DISPOSABLE

## (undated) DEVICE — TUBING HYDR IRR --

## (undated) DEVICE — NEONATAL-ADULT SPO2 SENSOR: Brand: NELLCOR

## (undated) DEVICE — KENDALL DL ECG CABLE AND LEAD WIRE SYSTEM, 3-LEAD, SINGLE PATIENT USE: Brand: KENDALL

## (undated) DEVICE — SLING ARM LIFETEC ORTH UNIV --

## (undated) DEVICE — K WIRE FIX L6IN DIA0.062IN 1600662] MICROAIRE SURGICAL INSTRUMENTS INC]

## (undated) DEVICE — SURGIFOAM SPNG SZ 12-7